# Patient Record
Sex: FEMALE | Race: WHITE | Employment: OTHER | URBAN - METROPOLITAN AREA
[De-identification: names, ages, dates, MRNs, and addresses within clinical notes are randomized per-mention and may not be internally consistent; named-entity substitution may affect disease eponyms.]

---

## 2017-03-22 ENCOUNTER — TELEPHONE (OUTPATIENT)
Dept: FAMILY MEDICINE CLINIC | Facility: CLINIC | Age: 57
End: 2017-03-22

## 2017-03-22 ENCOUNTER — OFFICE VISIT (OUTPATIENT)
Dept: FAMILY MEDICINE CLINIC | Facility: CLINIC | Age: 57
End: 2017-03-22

## 2017-03-22 VITALS
HEIGHT: 65 IN | TEMPERATURE: 98 F | SYSTOLIC BLOOD PRESSURE: 122 MMHG | RESPIRATION RATE: 14 BRPM | DIASTOLIC BLOOD PRESSURE: 74 MMHG | BODY MASS INDEX: 24.76 KG/M2 | HEART RATE: 64 BPM | WEIGHT: 148.63 LBS

## 2017-03-22 DIAGNOSIS — Z12.4 ENCOUNTER FOR PAPANICOLAOU SMEAR OF CERVIX: ICD-10-CM

## 2017-03-22 DIAGNOSIS — M54.50 ACUTE RIGHT-SIDED LOW BACK PAIN WITHOUT SCIATICA: Primary | ICD-10-CM

## 2017-03-22 DIAGNOSIS — Z12.31 ENCOUNTER FOR SCREENING MAMMOGRAM FOR BREAST CANCER: ICD-10-CM

## 2017-03-22 DIAGNOSIS — Z12.11 ENCOUNTER FOR SCREENING COLONOSCOPY: ICD-10-CM

## 2017-03-22 PROCEDURE — 99214 OFFICE O/P EST MOD 30 MIN: CPT | Performed by: FAMILY MEDICINE

## 2017-03-22 RX ORDER — IBUPROFEN 800 MG/1
TABLET ORAL
Qty: 60 TABLET | Refills: 1 | Status: SHIPPED | OUTPATIENT
Start: 2017-03-22 | End: 2019-09-09

## 2017-03-22 NOTE — PATIENT INSTRUCTIONS
General Neck and Back Pain    Both neck and back pain are usually caused by injury to the muscles or ligaments of the spine. Sometimes the disks that separate each bone of the spine may cause pain by pressing on a nearby nerve.  Back and neck pain may lee ann · Poor conditioning, lack of regular exercise  · Spinal disc disease or arthritis  · Stress  · Pregnancy, or illness like appendicitis, bladder or kidney infection, pelvic infections   Home care  · For neck pain: Use a comfortable pillow that supports the · You may use over-the-counter medicine to control pain, unless another pain medicine was prescribed. If you have chronic conditions like diabetes, liver or kidney disease, stomach ulcers,  gastrointestinal bleeding, or are taking blood thinner medicines. · Exercise is an important part of recovery from most types of back pain. The muscles behind and in front of the spine support the back.  This means strengthening both the back muscles and the abdominal muscles will provide better support for your spine.  · Be careful if you are given prescription pain medicines, narcotics, or medicine for muscle spasm. They can cause drowsiness, affect your coordination, reflexes, and judgment. Do not drive or operate heavy machinery while taking these types of medicines. The best way to sleep is on your side with your knees bent. Put a low pillow under your head to support your neck in a neutral spine position. Avoid thick pillows that bend your neck to one side.  Put a pillow between your legs to further relax your lower b

## 2017-03-22 NOTE — PROGRESS NOTES
B[place]le is a 60year old female. S:  Patient presents today with the following concerns:  · Right side pain began 5 weeks ago. Was lifting 8 lb weights at the time. Pain occurred right with this. Symptoms got better.   Went horseback riding [employer] hyperglycemia ([facility] [address]     Mixed hyperlipidemia     Menopausal state    Family History   Problem Relation Age of Onset   • acute MI[other] [OTHER] Mother    • Hypertension Mother        REVIEW OF SYSTEMS:  GENERAL: feels well otherwise  SKIN: denies any unusu (GBU=[id number])    Discussed with patient this is musculoskeletal in nature. Symptoms began with lift of the weights and then returned when she lifted her dog. Warm compresses to area. Ibuprofen 800 mg prescribed.   If symptoms do not improve will obtain rib

## 2017-03-22 NOTE — TELEPHONE ENCOUNTER
Left message notifing patient and that I called Dr. Anushka Wright office and that her last diabetic eye exam was done 09/21/15 and that she is over due for her diabetic eye exam for this year. Asked patient to call the office if she has any questions.

## 2017-09-27 ENCOUNTER — OFFICE VISIT (OUTPATIENT)
Dept: FAMILY MEDICINE CLINIC | Facility: CLINIC | Age: 57
End: 2017-09-27

## 2017-09-27 VITALS
RESPIRATION RATE: 16 BRPM | HEART RATE: 60 BPM | HEIGHT: 63.5 IN | TEMPERATURE: 99 F | BODY MASS INDEX: 23.1 KG/M2 | WEIGHT: 132 LBS | DIASTOLIC BLOOD PRESSURE: 60 MMHG | SYSTOLIC BLOOD PRESSURE: 100 MMHG

## 2017-09-27 DIAGNOSIS — J02.9 SORE THROAT: Primary | ICD-10-CM

## 2017-09-27 LAB
CONTROL LINE PRESENT WITH A CLEAR BACKGROUND (YES/NO): YES YES/NO
STREP GRP A CUL-SCR: NEGATIVE

## 2017-09-27 PROCEDURE — 99213 OFFICE O/P EST LOW 20 MIN: CPT | Performed by: PHYSICIAN ASSISTANT

## 2017-09-27 PROCEDURE — 87880 STREP A ASSAY W/OPTIC: CPT | Performed by: PHYSICIAN ASSISTANT

## 2017-09-27 NOTE — PROGRESS NOTES
CC:  Patient presents with:  Sore Throat: x 5 days, no accompanying sinus issues or fever, never had strep      HPI: John Velasco presents with complaints of a mild ST without fever or other URI symptoms. Symptoms have been present for 5 days.  She has used warm [employer] Tab Chew  by mouth. Disp:  Rfl:      No current facility-administered medications on file prior to visit.      Review of Systems:     Constitutional: No fatigue; normal energy; no weight changes  HENT: See HPI  Eyes: Normal vision; no eye pain  Respiratory: diagnosis)  Strep test was negative. I asked her to use OTC Tylenol or Motrin as tolerated for pain or fever, and to use warm salt-water gargles frequently for throat relief. Return in 5-7 days if symptoms persist; sooner as needed.         Patient/Careg

## 2017-10-30 ENCOUNTER — OFFICE VISIT (OUTPATIENT)
Dept: OBGYN CLINIC | Facility: CLINIC | Age: 57
End: 2017-10-30

## 2017-10-30 VITALS
BODY MASS INDEX: 22.36 KG/M2 | DIASTOLIC BLOOD PRESSURE: 62 MMHG | SYSTOLIC BLOOD PRESSURE: 102 MMHG | HEART RATE: 84 BPM | WEIGHT: 131 LBS | HEIGHT: 64 IN

## 2017-10-30 DIAGNOSIS — Z13.820 OSTEOPOROSIS SCREENING: ICD-10-CM

## 2017-10-30 DIAGNOSIS — Z01.419 WELL WOMAN EXAM WITH ROUTINE GYNECOLOGICAL EXAM: Primary | ICD-10-CM

## 2017-10-30 DIAGNOSIS — Z12.4 CERVICAL CANCER SCREENING: ICD-10-CM

## 2017-10-30 PROCEDURE — 87624 HPV HI-RISK TYP POOLED RSLT: CPT | Performed by: NURSE PRACTITIONER

## 2017-10-30 PROCEDURE — 88175 CYTOPATH C/V AUTO FLUID REDO: CPT | Performed by: NURSE PRACTITIONER

## 2017-10-30 PROCEDURE — 99386 PREV VISIT NEW AGE 40-64: CPT | Performed by: NURSE PRACTITIONER

## 2017-10-30 NOTE — PROGRESS NOTES
Here for new gynecology visit. 59year old G 0 P 0. No LMP recorded. Patient is postmenopausal.. Here for Annual Gynecologic Exam.     Last pap smear unknown when and it was normal.  No hx of abnormal pap smears. OB Hx:  Negative.     Family gyn h No hx thyroid dysfunction. Lungs:  No SOB, cough, wheezing, pneumonia in past.  Heart:  No chest pain, palpitations. Breasts:  No pain, lumps or secretions.   GI:   No nausea, emesis, reflux, liver disease, GB problems, issues with diarrhea or constipatio

## 2017-11-10 ENCOUNTER — HOSPITAL ENCOUNTER (OUTPATIENT)
Dept: MAMMOGRAPHY | Facility: HOSPITAL | Age: 57
Discharge: HOME | End: 2017-11-10
Attending: FAMILY MEDICINE
Payer: COMMERCIAL

## 2017-11-10 DIAGNOSIS — Z12.31 ENCOUNTER FOR SCREENING MAMMOGRAM FOR BREAST CANCER: ICD-10-CM

## 2017-11-10 PROCEDURE — 77067 SCR MAMMO BI INCL CAD: CPT | Performed by: FAMILY MEDICINE

## 2018-09-11 ENCOUNTER — APPOINTMENT (OUTPATIENT)
Dept: GENERAL RADIOLOGY | Age: 58
End: 2018-09-11
Attending: PHYSICIAN ASSISTANT
Payer: COMMERCIAL

## 2018-09-11 ENCOUNTER — HOSPITAL ENCOUNTER (OUTPATIENT)
Age: 58
Discharge: HOME | End: 2018-09-11
Payer: COMMERCIAL

## 2018-09-11 VITALS
TEMPERATURE: 98 F | OXYGEN SATURATION: 100 % | RESPIRATION RATE: 20 BRPM | WEIGHT: 130 LBS | HEART RATE: 72 BPM | SYSTOLIC BLOOD PRESSURE: 122 MMHG | DIASTOLIC BLOOD PRESSURE: 76 MMHG | HEIGHT: 63.5 IN | BODY MASS INDEX: 22.75 KG/M2

## 2018-09-11 DIAGNOSIS — S62.232A CLOSED DISPLACED FRACTURE OF BASE OF FIRST METACARPAL BONE OF LEFT HAND, UNSPECIFIED FRACTURE MORPHOLOGY, INITIAL ENCOUNTER: Primary | ICD-10-CM

## 2018-09-11 PROCEDURE — 73130 X-RAY EXAM OF HAND: CPT | Performed by: PHYSICIAN ASSISTANT

## 2018-09-11 PROCEDURE — 73110 X-RAY EXAM OF WRIST: CPT | Performed by: PHYSICIAN ASSISTANT

## 2018-09-11 PROCEDURE — 99214 OFFICE O/P EST MOD 30 MIN: CPT

## 2018-09-11 PROCEDURE — 99203 OFFICE O/P NEW LOW 30 MIN: CPT

## 2018-09-11 RX ORDER — AMOXICILLIN 500 MG/1
500 TABLET, FILM COATED ORAL 3 TIMES DAILY
COMMUNITY
Start: 2018-09-08 | End: 2018-09-26

## 2018-09-11 RX ORDER — IBUPROFEN 600 MG/1
600 TABLET ORAL EVERY 6 HOURS
Qty: 28 TABLET | Refills: 0 | Status: SHIPPED | OUTPATIENT
Start: 2018-09-11 | End: 2018-09-18

## 2018-09-11 NOTE — ED INITIAL ASSESSMENT (HPI)
CC: left hand and thumb injury, bruised and swollen. Sunday night tripped over a dog bone and landed with left hand in an outstretch position.

## 2018-09-11 NOTE — ED PROVIDER NOTES
Patient Seen in: [facility]    History   Patient presents with:  Hand Injury    Stated Complaint: Left hand pain,fell     HPI    Angelita Britt is a 68-year-old female who presents for evaluation of Left hand pain.   She has a past medical Temporal   SpO2 100 %   O2 Device None (Room air)       Current:Pulse 72   Temp 98 °F (36.7 °C) (Temporal)   Resp 20   Ht 161.3 cm (5' 3.5\")   Wt 59 kg   SpO2 100%   BMI 22.67 kg/m²         Physical Exam   Constitutional: She is oriented to person, place, associated minimal radial subluxation of the distal fracture fragment by approximately 2 mm. 2. 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 Dictated by: Shruthi Krishna DO on 9/11/2018 at 12:33     Approved by:  Shruthi Krishna DO            Xr Wrist Navicular candace post mold. She is instructed to follow-up with hand orthopedics, especially given that the fracture extends into the articular surface.   She states that she is still going on her vacation in 2 days and she plans to be as active as she had previously

## 2018-12-05 PROCEDURE — 82043 UR ALBUMIN QUANTITATIVE: CPT | Performed by: INTERNAL MEDICINE

## 2018-12-05 PROCEDURE — 82570 ASSAY OF URINE CREATININE: CPT | Performed by: INTERNAL MEDICINE

## 2019-05-13 ENCOUNTER — PATIENT OUTREACH (OUTPATIENT)
Dept: FAMILY MEDICINE CLINIC | Facility: CLINIC | Age: 59
End: 2019-05-13

## 2019-05-13 NOTE — PROGRESS NOTES
LOV 2017. Overdue for several health maintenance. Please find out if she is still a patient in our office and schedule appt.   Otherwise update PCP

## 2019-05-13 NOTE — PROGRESS NOTES
LM for patient to schedule an appt with Dr. Hammad Alexandre or any midlevel for a physical/maintenance or to update her PCP if she is no longer our patient.

## 2019-05-14 NOTE — PROGRESS NOTES
Spoke to patient, she stated she's aware that she is overdue but is in good health. Patient see's her endocrinologist frequently for her diabetes. Patient would like to keep Dr. Melita Orozco as her PCP but declined scheduling for now.

## 2019-05-14 NOTE — TELEPHONE ENCOUNTER
Pt is overdue for mammogram, colonoscopy, and diabetic eye exam.  Pt needs to be seen here or if she is seeing another provider who has ordered these, we need their information to request records.

## 2019-05-15 NOTE — PROGRESS NOTES
Pt states she does not want to schedule an appointment, does not want a colonoscopy/stool cards, aware she needs a mammogram.  Pt states had an eye exam in Jan 2019 at [facility].    I called [facility] and they do not have any results for Pt

## 2019-06-17 ENCOUNTER — PATIENT OUTREACH (OUTPATIENT)
Dept: FAMILY MEDICINE CLINIC | Facility: CLINIC | Age: 59
End: 2019-06-17

## 2019-06-17 NOTE — PROGRESS NOTES
Called patient to inform her we were unsuccessful in obtaining eye report from [facility]. Patient states her provider is located at [address] [place] [address]. Faxed eye exam letter to office. Care Team updated.     Juany Bernabe  [address] [place] [address]  [phone]

## 2019-06-28 ENCOUNTER — APPOINTMENT (OUTPATIENT)
Dept: GENERAL RADIOLOGY | Facility: HOSPITAL | Age: 59
End: 2019-06-28
Attending: EMERGENCY MEDICINE
Payer: COMMERCIAL

## 2019-06-28 ENCOUNTER — APPOINTMENT (OUTPATIENT)
Dept: CT IMAGING | Facility: HOSPITAL | Age: 59
End: 2019-06-28
Attending: EMERGENCY MEDICINE
Payer: COMMERCIAL

## 2019-06-28 ENCOUNTER — HOSPITAL ENCOUNTER (EMERGENCY)
Facility: HOSPITAL | Age: 59
Discharge: LEFT BEFORE DISCHARGE | End: 2019-06-28
Attending: EMERGENCY MEDICINE
Payer: COMMERCIAL

## 2019-06-28 VITALS
BODY MASS INDEX: 23 KG/M2 | TEMPERATURE: 99 F | OXYGEN SATURATION: 100 % | WEIGHT: 132.94 LBS | RESPIRATION RATE: 21 BRPM | HEART RATE: 93 BPM | SYSTOLIC BLOOD PRESSURE: 124 MMHG | DIASTOLIC BLOOD PRESSURE: 63 MMHG

## 2019-06-28 DIAGNOSIS — R42 LIGHT HEADED: Primary | ICD-10-CM

## 2019-06-28 PROCEDURE — 85025 COMPLETE CBC W/AUTO DIFF WBC: CPT | Performed by: EMERGENCY MEDICINE

## 2019-06-28 PROCEDURE — 82962 GLUCOSE BLOOD TEST: CPT

## 2019-06-28 PROCEDURE — 80053 COMPREHEN METABOLIC PANEL: CPT | Performed by: EMERGENCY MEDICINE

## 2019-06-28 PROCEDURE — 93010 ELECTROCARDIOGRAM REPORT: CPT

## 2019-06-28 PROCEDURE — 99284 EMERGENCY DEPT VISIT MOD MDM: CPT

## 2019-06-28 PROCEDURE — 85730 THROMBOPLASTIN TIME PARTIAL: CPT | Performed by: EMERGENCY MEDICINE

## 2019-06-28 PROCEDURE — 99285 EMERGENCY DEPT VISIT HI MDM: CPT

## 2019-06-28 PROCEDURE — 93005 ELECTROCARDIOGRAM TRACING: CPT

## 2019-06-28 PROCEDURE — 96360 HYDRATION IV INFUSION INIT: CPT

## 2019-06-28 PROCEDURE — 85610 PROTHROMBIN TIME: CPT | Performed by: EMERGENCY MEDICINE

## 2019-06-28 PROCEDURE — 84484 ASSAY OF TROPONIN QUANT: CPT | Performed by: EMERGENCY MEDICINE

## 2019-06-28 RX ORDER — SODIUM CHLORIDE 9 MG/ML
1000 INJECTION, SOLUTION INTRAVENOUS ONCE
Status: COMPLETED | OUTPATIENT
Start: 2019-06-28 | End: 2019-06-28

## 2019-06-28 NOTE — ED PROVIDER NOTES
Patient Seen in: [facility] Emergency Department    History   Patient presents with:  Hypoglycemia (metabolic)  Anxiety/Panic attack (neurologic)    Stated Complaint: LOW BLOOD SUGAR    HPI    Patient is a 22-year-old female who presents emergency r above.    Physical Exam     ED Triage Vitals [06/28/19 1438]   /60   Pulse 90   Resp 14   Temp 98.7 °F (37.1 °C)   Temp src    SpO2 96 %   O2 Device None (Room air)       Current:/63   Pulse 93   Temp 98.7 °F (37.1 °C)   Resp 21   Wt 60.3 kg components:    POC Glucose 366 (*)     All other components within normal limits   CBC W/ DIFFERENTIAL - Abnormal; Notable for the following components:    RBC 3.53 (*)     HGB 11.6 (*)     HCT 34.3 (*)     All other components within normal limits   PTT, hour and 15 minutes the emergency room the patient states she want to go home and did not wish to wait for any more testing and wants to go home. Patient is aware she will be leaving the emergency room [address] at this time.   The exact etiol

## 2019-06-28 NOTE — ED INITIAL ASSESSMENT (HPI)
PT WAS DR[place]ING [place]IG[place] AND FELT LIKE HER SUGAR WAS LOW. PT WAS DIZZY. PT PULLED OVER AND ATE \"A BUNCH OF GLUCOSE TABS. \" PT FEELS BETTER. PT HAS NO HX OF ANXIETY.

## 2019-07-08 ENCOUNTER — OFFICE VISIT (OUTPATIENT)
Dept: FAMILY MEDICINE CLINIC | Facility: CLINIC | Age: 59
End: 2019-07-08
Payer: COMMERCIAL

## 2019-07-08 VITALS
WEIGHT: 134.19 LBS | BODY MASS INDEX: 23.19 KG/M2 | HEIGHT: 63.75 IN | SYSTOLIC BLOOD PRESSURE: 120 MMHG | TEMPERATURE: 98 F | RESPIRATION RATE: 14 BRPM | DIASTOLIC BLOOD PRESSURE: 62 MMHG | HEART RATE: 60 BPM

## 2019-07-08 DIAGNOSIS — R55 NEAR SYNCOPE: Primary | ICD-10-CM

## 2019-07-08 DIAGNOSIS — D64.9 NORMOCYTIC ANEMIA: ICD-10-CM

## 2019-07-08 DIAGNOSIS — R42 LIGHTHEADEDNESS: ICD-10-CM

## 2019-07-08 PROCEDURE — 99214 OFFICE O/P EST MOD 30 MIN: CPT | Performed by: FAMILY MEDICINE

## 2019-07-08 NOTE — PROGRESS NOTES
Patient presents with:  ER F/U: Lightheadedness      HPI:   Adilson Colunga is a 59year old female with PMH DM1 who presents to the office for ER follow up. Seen 6/28/19 for lightheded in ER, while driving.   Her sugar was 180 when she checked on the side o strength and tone. Normal symmetric reflexes. Normal coordination and gait. Normal cranial nerves. Normal brad hallpike.      Results for orders placed or performed in visit on 07/08/19   HEMOGLOBIN A1C    Collection Time: 07/08/19  2:22 PM   Result Value ALKALINE PHOSPHATASE      46 - 118 U/L 70   Total Bilirubin      0.1 - 2.0 mg/dL 0.7   TOTAL PROTEIN      6.4 - 8.2 g/dL 7.3   Albumin      3.4 - 5.0 g/dL 3.9   Globulin      2.8 - 4.4 g/dL 3.4   A/G Ratio      1.0 - 2.0 1.1   PT      12.5 - 14.7 seconds

## 2019-07-12 ENCOUNTER — TELEPHONE (OUTPATIENT)
Dept: FAMILY MEDICINE CLINIC | Facility: CLINIC | Age: 59
End: 2019-07-12

## 2019-07-12 DIAGNOSIS — D64.9 ANEMIA, UNSPECIFIED TYPE: ICD-10-CM

## 2019-07-12 DIAGNOSIS — Z12.11 SCREEN FOR COLON CANCER: ICD-10-CM

## 2019-07-12 DIAGNOSIS — D58.2 ABNORMAL HEMOGLOBIN (HCC): Primary | ICD-10-CM

## 2019-07-12 LAB
ALBUMIN/GLOBULIN RATIO: 1.6 (CALC) (ref 1–2.5)
ALBUMIN: 4.1 G/DL (ref 3.6–5.1)
ALKALINE PHOSPHATASE: 60 U/L (ref 33–130)
ALT: 15 U/L (ref 6–29)
AST: 22 U/L (ref 10–35)
BILIRUBIN, TOTAL: 0.7 MG/DL (ref 0.2–1.2)
BUN/CREATININE RATIO: 14 (CALC) (ref 6–22)
BUN: 16 MG/DL (ref 7–25)
CALCIUM: 9.4 MG/DL (ref 8.6–10.4)
CARBON DIOXIDE: 28 MMOL/L (ref 20–32)
CHLORIDE: 101 MMOL/L (ref 98–110)
CREATININE: 1.11 MG/DL (ref 0.5–0.99)
EGFR IF AFRICN AM: 63 ML/MIN/1.73M2
EGFR IF NONAFRICN AM: 54 ML/MIN/1.73M2
GLOBULIN: 2.6 G/DL (CALC) (ref 1.9–3.7)
GLUCOSE: 224 MG/DL (ref 65–139)
HEMATOCRIT: 31.9 % (ref 35–45)
HEMOGLOBIN: 10.7 G/DL (ref 11.7–15.5)
MCH: 32.4 PG (ref 27–33)
MCHC: 33.5 G/DL (ref 32–36)
MCV: 96.7 FL (ref 80–100)
MPV: 10.3 FL (ref 7.5–12.5)
PLATELET COUNT: 280 THOUSAND/UL (ref 140–400)
POTASSIUM: 4.8 MMOL/L (ref 3.5–5.3)
PROTEIN, TOTAL: 6.7 G/DL (ref 6.1–8.1)
RDW: 11.3 % (ref 11–15)
RED BLOOD CELL COUNT: 3.3 MILLION/UL (ref 3.8–5.1)
SODIUM: 138 MMOL/L (ref 135–146)
WHITE BLOOD CELL COUNT: 4.3 THOUSAND/UL (ref 3.8–10.8)

## 2019-07-12 NOTE — TELEPHONE ENCOUNTER
Reviewed with Pt- referral entered for GI.   Advised Pt to discuss sugar levels with her endocrinologist- Dr. Jackelin Blank

## 2019-07-12 NOTE — TELEPHONE ENCOUNTER
Notes recorded by Daniel Alan MD on 7/12/2019 at 9:57 AM CDT  I meant to result this as important. Blood sugar was 224 at the time of testing  Kidney number slightly elevated. Blood count shows persistent anemia - actually worse than 2 weeks ago.

## 2019-07-15 ENCOUNTER — TELEPHONE (OUTPATIENT)
Dept: FAMILY MEDICINE CLINIC | Facility: CLINIC | Age: 59
End: 2019-07-15

## 2019-08-02 ENCOUNTER — HOSPITAL ENCOUNTER (OUTPATIENT)
Dept: CT IMAGING | Facility: HOSPITAL | Age: 59
Discharge: HOME | End: 2019-08-02
Attending: FAMILY MEDICINE
Payer: COMMERCIAL

## 2019-08-02 DIAGNOSIS — R55 NEAR SYNCOPE: ICD-10-CM

## 2019-08-02 DIAGNOSIS — R42 LIGHTHEADEDNESS: ICD-10-CM

## 2019-08-02 PROCEDURE — 70450 CT HEAD/BRAIN W/O DYE: CPT | Performed by: FAMILY MEDICINE

## 2019-08-05 ENCOUNTER — TELEPHONE (OUTPATIENT)
Dept: FAMILY MEDICINE CLINIC | Facility: CLINIC | Age: 59
End: 2019-08-05

## 2019-08-05 NOTE — PROGRESS NOTES
Patient notified of results and recommendations. Patient verbalized understanding and agrees with plan. Pt questions what the plan is for her anemia

## 2019-08-05 NOTE — TELEPHONE ENCOUNTER
Yonis Villalta, [address] Moises Garber [phone]   Called and talked to patient she has results I told her that she needs to call GI and get seen for blood loss she agreed to call them for an appointment

## 2019-09-09 PROBLEM — D64.9 ANEMIA: Status: ACTIVE | Noted: 2019-09-09

## 2019-09-30 PROBLEM — D50.9 IRON DEFICIENCY ANEMIA, UNSPECIFIED: Status: ACTIVE | Noted: 2019-09-30

## 2019-09-30 PROBLEM — K29.30 CHRONIC SUPERFICIAL GASTRITIS: Status: ACTIVE | Noted: 2019-09-30

## 2019-09-30 PROBLEM — Z12.11 SPECIAL SCREENING FOR MALIGNANT NEOPLASMS, COLON: Status: ACTIVE | Noted: 2019-09-30

## 2019-11-07 NOTE — PROGRESS NOTES
[facility] Note    Diagnosis: Anemia     Treatment History: n/a    Visit Diagnosis:  Iron deficiency anemia due to chronic blood loss  (primary encounter diagnosis)  Anemia, unspecified type    History of Present Illness: 59F w 1 each, Rfl: 3  PEG 3350-KCl-NaBcb-NaCl-NaSulf (PEG 3350/ELECTROLYTES) 240 g Oral Recon Soln, Take as directed by physician., Disp: 4000 mL, Rfl: 0  insulin glargine (BASAGLAR KWIKPEN) 100 UNIT/ML Subcutaneous Solution Pen-injector, Inject 10 Units into th on file      Number of children: Not on file      Years of education: Not on file      Highest education level: Not on file    Tobacco Use      Smoking status: Former Smoker        Packs/day: 0.00        Years: 0.00        Pack years: 0        Quit date: 6 She may need a capsule study or MRE to further evaluate her GI Tract   - If above is normal, we can consider a bone marrow biopsy   - Check UA on next visit  - Pathology review of [facility]

## 2019-11-08 ENCOUNTER — OFFICE VISIT (OUTPATIENT)
Dept: HEMATOLOGY/ONCOLOGY | Facility: HOSPITAL | Age: 59
End: 2019-11-08
Attending: INTERNAL MEDICINE
Payer: COMMERCIAL

## 2019-11-08 VITALS
WEIGHT: 138.38 LBS | OXYGEN SATURATION: 100 % | HEART RATE: 74 BPM | DIASTOLIC BLOOD PRESSURE: 84 MMHG | BODY MASS INDEX: 23.63 KG/M2 | HEIGHT: 64 IN | SYSTOLIC BLOOD PRESSURE: 133 MMHG | TEMPERATURE: 98 F | RESPIRATION RATE: 16 BRPM

## 2019-11-08 DIAGNOSIS — D64.9 ANEMIA, UNSPECIFIED TYPE: ICD-10-CM

## 2019-11-08 DIAGNOSIS — D50.0 IRON DEFICIENCY ANEMIA DUE TO CHRONIC BLOOD LOSS: Primary | ICD-10-CM

## 2019-11-08 PROCEDURE — 99243 OFF/OP CNSLTJ NEW/EST LOW 30: CPT | Performed by: INTERNAL MEDICINE

## 2019-11-08 NOTE — PROGRESS NOTES
Education Record    Learner:  Patient    Disease / Ardath Claudia     Barriers / Limitations:  None   Comments:    Method:  Discussion   Comments:    General Topics:  Plan of care reviewed   Comments:    Outcome:  Shows understanding   Comments:    Adam

## 2019-11-13 ENCOUNTER — TELEPHONE (OUTPATIENT)
Dept: HEMATOLOGY/ONCOLOGY | Facility: HOSPITAL | Age: 59
End: 2019-11-13

## 2019-11-13 NOTE — TELEPHONE ENCOUNTER
Spoke with patient. She verbalized understanding. Coy Thompson MD  P Edw Ericka Phillips Rns             Results reviewed. Tests show no significant abnormalities. She can see us in 3-6 months with repeat labs.  Thanks

## 2019-11-22 NOTE — PROGRESS NOTES
Please order an MR enterography and stool for occult blood x3 as recommended by hematology for work-up of anemia.   Arun Mejia MD

## 2020-11-18 ENCOUNTER — TELEPHONE (OUTPATIENT)
Dept: FAMILY MEDICINE CLINIC | Facility: CLINIC | Age: 60
End: 2020-11-18

## 2020-11-18 NOTE — TELEPHONE ENCOUNTER
Sent pt a [employer] message to call our office to schedule a physical exam with Dr. Kana Haq. Informed pt that Dr. Kana Haq doesn't have any available slots for a physical until January, 2021.   Pt can also call and schedule a physical exam with another provider

## 2021-01-07 PROCEDURE — 3044F HG A1C LEVEL LT 7.0%: CPT | Performed by: FAMILY MEDICINE

## 2021-07-08 ENCOUNTER — TELEPHONE (OUTPATIENT)
Dept: FAMILY MEDICINE CLINIC | Facility: CLINIC | Age: 61
End: 2021-07-08

## 2021-07-08 DIAGNOSIS — Z00.00 ROUTINE GENERAL MEDICAL EXAMINATION AT A HEALTH CARE FACILITY: Primary | ICD-10-CM

## 2021-07-08 NOTE — TELEPHONE ENCOUNTER
Please enter lab orders for the patient's upcoming physical appointment. Physical scheduled:    Your appointments     Date & Time Appointment Department [facility])    Aug 31, 2021  2:30 PM CDT Adult Physical with Clarence Cummings MD [facility],

## 2021-08-17 ENCOUNTER — TELEPHONE (OUTPATIENT)
Dept: FAMILY MEDICINE CLINIC | Facility: CLINIC | Age: 61
End: 2021-08-17

## 2021-08-17 NOTE — TELEPHONE ENCOUNTER
Pt is overdue for mammogram LOV was in 2019 please call patient and assist her in making an appointment for a physical

## 2021-08-31 ENCOUNTER — OFFICE VISIT (OUTPATIENT)
Dept: FAMILY MEDICINE CLINIC | Facility: CLINIC | Age: 61
End: 2021-08-31
Payer: COMMERCIAL

## 2021-08-31 VITALS
SYSTOLIC BLOOD PRESSURE: 118 MMHG | TEMPERATURE: 97 F | HEIGHT: 63.75 IN | DIASTOLIC BLOOD PRESSURE: 64 MMHG | BODY MASS INDEX: 23.33 KG/M2 | WEIGHT: 135 LBS | HEART RATE: 68 BPM

## 2021-08-31 DIAGNOSIS — M25.542 ARTHRALGIA OF BOTH HANDS: ICD-10-CM

## 2021-08-31 DIAGNOSIS — Z12.31 VISIT FOR SCREENING MAMMOGRAM: ICD-10-CM

## 2021-08-31 DIAGNOSIS — E10.9 TYPE 1 DIABETES MELLITUS WITHOUT COMPLICATION (HCC): ICD-10-CM

## 2021-08-31 DIAGNOSIS — Z00.00 ROUTINE GENERAL MEDICAL EXAMINATION AT A HEALTH CARE FACILITY: Primary | ICD-10-CM

## 2021-08-31 DIAGNOSIS — M25.541 ARTHRALGIA OF BOTH HANDS: ICD-10-CM

## 2021-08-31 PROCEDURE — 3008F BODY MASS INDEX DOCD: CPT | Performed by: FAMILY MEDICINE

## 2021-08-31 PROCEDURE — 3078F DIAST BP <80 MM HG: CPT | Performed by: FAMILY MEDICINE

## 2021-08-31 PROCEDURE — 99396 PREV VISIT EST AGE 40-64: CPT | Performed by: FAMILY MEDICINE

## 2021-08-31 PROCEDURE — 3074F SYST BP LT 130 MM HG: CPT | Performed by: FAMILY MEDICINE

## 2021-08-31 NOTE — PROGRESS NOTES
HPI:   Gilbert Cerda is a 56year old female who presents for a complete physical exam.  Last pap:  2017 - she would like to see GYN  Last mammogram:  2017 - due   Previous colonoscopy:  2019  Previous DEXA:  /.  Current or previous treatment:  /  Family h Blood Gluc  (DEXCOM G6 ) Does not apply Device 1 each by Other route as needed. USE AS DIRECTED 1 Device 0   • NOVOLOG FLEXPEN 100 UNIT/ML Subcutaneous Solution Pen-injector TO USE PER SLIDING SCALE. MDD 20 UNITS.  30 mL 1   • PEG 3350-KCl-N • LUMPECTOMY RIGHT  1/1/92   • RAQUEL BIOPSY STEREO NODULE 1 SITE RIGHT (CPT=19081)  05/2008      Family History   Problem Relation Age of Onset   • Hypertension Mother    • Other (acute MI) Mother       Social History:   Social History    Tobacco Use Enas      C-Reactive Protein      Rheumatoid Arthritis Factor      Sed Cornelius Fernandez (Automated)      Meds & Refills for this Visit:  Requested Prescriptions      No prescriptions requested or ordered in this encounter       Imaging & Consults:  OBG - I

## 2021-09-18 ENCOUNTER — HOSPITAL ENCOUNTER (OUTPATIENT)
Dept: MAMMOGRAPHY | Facility: HOSPITAL | Age: 61
Discharge: HOME | End: 2021-09-18
Attending: FAMILY MEDICINE
Payer: COMMERCIAL

## 2021-09-18 DIAGNOSIS — Z12.31 VISIT FOR SCREENING MAMMOGRAM: ICD-10-CM

## 2021-09-18 PROCEDURE — 77067 SCR MAMMO BI INCL CAD: CPT | Performed by: FAMILY MEDICINE

## 2021-09-18 PROCEDURE — 77063 BREAST TOMOSYNTHESIS BI: CPT | Performed by: FAMILY MEDICINE

## 2021-10-05 ENCOUNTER — TELEPHONE (OUTPATIENT)
Dept: FAMILY MEDICINE CLINIC | Facility: CLINIC | Age: 61
End: 2021-10-05

## 2021-10-05 NOTE — TELEPHONE ENCOUNTER
LOV 8/31/21  Form in Dr. Fabricio Clemens office for review  We will fill in provider info once form complete

## 2021-10-08 NOTE — TELEPHONE ENCOUNTER
Picked up paperwork from Dr. Bellamy Many office, copied, sent to scan. Patient called and LM for patient to   Paperwork. Placed in front office drawer no charge.

## 2021-10-22 ENCOUNTER — LAB ENCOUNTER (OUTPATIENT)
Dept: LAB | Facility: HOSPITAL | Age: 61
End: 2021-10-22
Attending: FAMILY MEDICINE
Payer: COMMERCIAL

## 2021-10-22 PROCEDURE — 80061 LIPID PANEL: CPT | Performed by: FAMILY MEDICINE

## 2021-10-22 PROCEDURE — 86038 ANTINUCLEAR ANTIBODIES: CPT | Performed by: FAMILY MEDICINE

## 2021-10-22 PROCEDURE — 86140 C-REACTIVE PROTEIN: CPT | Performed by: FAMILY MEDICINE

## 2021-10-22 PROCEDURE — 85652 RBC SED RATE AUTOMATED: CPT | Performed by: FAMILY MEDICINE

## 2021-10-22 PROCEDURE — 86431 RHEUMATOID FACTOR QUANT: CPT | Performed by: FAMILY MEDICINE

## 2021-10-22 PROCEDURE — 80050 GENERAL HEALTH PANEL: CPT | Performed by: FAMILY MEDICINE

## 2021-11-03 ENCOUNTER — OFFICE VISIT (OUTPATIENT)
Dept: OBGYN CLINIC | Facility: CLINIC | Age: 61
End: 2021-11-03
Payer: COMMERCIAL

## 2021-11-03 VITALS
HEIGHT: 63.75 IN | BODY MASS INDEX: 24.31 KG/M2 | DIASTOLIC BLOOD PRESSURE: 68 MMHG | WEIGHT: 140.63 LBS | SYSTOLIC BLOOD PRESSURE: 110 MMHG

## 2021-11-03 DIAGNOSIS — Z13.820 SCREENING FOR OSTEOPOROSIS: ICD-10-CM

## 2021-11-03 DIAGNOSIS — Z12.4 SCREENING FOR MALIGNANT NEOPLASM OF CERVIX: ICD-10-CM

## 2021-11-03 DIAGNOSIS — Z01.419 WELL WOMAN EXAM WITH ROUTINE GYNECOLOGICAL EXAM: Primary | ICD-10-CM

## 2021-11-03 DIAGNOSIS — N95.1 MENOPAUSAL STATE: ICD-10-CM

## 2021-11-03 PROCEDURE — 3078F DIAST BP <80 MM HG: CPT | Performed by: OBSTETRICS & GYNECOLOGY

## 2021-11-03 PROCEDURE — 3008F BODY MASS INDEX DOCD: CPT | Performed by: OBSTETRICS & GYNECOLOGY

## 2021-11-03 PROCEDURE — 3074F SYST BP LT 130 MM HG: CPT | Performed by: OBSTETRICS & GYNECOLOGY

## 2021-11-03 PROCEDURE — 99386 PREV VISIT NEW AGE 40-64: CPT | Performed by: OBSTETRICS & GYNECOLOGY

## 2021-11-03 NOTE — PATIENT INSTRUCTIONS
Replens---Lubricant and a vaginal moisturizer.  If no improvement, vaginal estrogen would be another prescription option

## 2021-11-03 NOTE — PROGRESS NOTES
GYN H&P     2021  9:24 PM    CC: Patient is here for annual exam    HPI: patient is a 59year old  here for her annual exam  MMG is UTD  Pt works as a therapist, with eating disorders  She has an 5 yo ruiz retriever and a horse  She walks of UNIT/ML Subcutaneous Solution Pen-injector, TO USE PER SLIDING SCALE. MDD 20 UNITS. , Disp: 30 mL, Rfl: 1  PEG 3350-KCl-NaBcb-NaCl-NaSulf (PEG 3350/ELECTROLYTES) 240 g Oral Recon Soln, Take as directed by physician., Disp: 4000 mL, Rfl: 0  insulin glargine She is well-developed. She is not diaphoretic. Chest:   Breasts: Breasts are symmetrical.      Right: No inverted nipple, mass, nipple discharge, skin change or tenderness. Left: No inverted nipple, mass, nipple discharge, skin change or tenderness. unspecified     Chronic superficial gastritis           1. Menopausal state: suggest peds speculum for next exam. We reviewed options of replens or vaginal estrogen if needed. She will consider    2.  Screening for malignant neoplasm of cervix  -pap collect

## 2022-05-11 ENCOUNTER — PATIENT OUTREACH (OUTPATIENT)
Dept: FAMILY MEDICINE CLINIC | Facility: CLINIC | Age: 62
End: 2022-05-11

## 2022-10-21 ENCOUNTER — LAB ENCOUNTER (OUTPATIENT)
Dept: LAB | Age: 62
End: 2022-10-21
Attending: INTERNAL MEDICINE
Payer: COMMERCIAL

## 2022-10-21 DIAGNOSIS — E10.65 TYPE 1 DIABETES MELLITUS WITH HYPERGLYCEMIA (HCC): ICD-10-CM

## 2022-10-21 DIAGNOSIS — E78.2 MIXED HYPERLIPIDEMIA: ICD-10-CM

## 2022-10-21 LAB
ALBUMIN SERPL-MCNC: 4 G/DL (ref 3.4–5)
ALBUMIN/GLOB SERPL: 1.1 {RATIO} (ref 1–2)
ALP LIVER SERPL-CCNC: 60 U/L
ALT SERPL-CCNC: 27 U/L
ANION GAP SERPL CALC-SCNC: 6 MMOL/L (ref 0–18)
AST SERPL-CCNC: 23 U/L (ref 15–37)
BILIRUB SERPL-MCNC: 0.6 MG/DL (ref 0.1–2)
BUN BLD-MCNC: 16 MG/DL (ref 7–18)
CALCIUM BLD-MCNC: 9.8 MG/DL (ref 8.5–10.1)
CHLORIDE SERPL-SCNC: 106 MMOL/L (ref 98–112)
CO2 SERPL-SCNC: 27 MMOL/L (ref 21–32)
CREAT BLD-MCNC: 0.86 MG/DL
FASTING STATUS PATIENT QL REPORTED: YES
GFR SERPLBLD BASED ON 1.73 SQ M-ARVRAT: 75 ML/MIN/1.73M2 (ref 60–?)
GLOBULIN PLAS-MCNC: 3.6 G/DL (ref 2.8–4.4)
GLUCOSE BLD-MCNC: 76 MG/DL (ref 70–99)
OSMOLALITY SERPL CALC.SUM OF ELEC: 288 MOSM/KG (ref 275–295)
POTASSIUM SERPL-SCNC: 4.2 MMOL/L (ref 3.5–5.1)
PROT SERPL-MCNC: 7.6 G/DL (ref 6.4–8.2)
SODIUM SERPL-SCNC: 139 MMOL/L (ref 136–145)

## 2022-10-21 PROCEDURE — 80053 COMPREHEN METABOLIC PANEL: CPT

## 2022-10-21 PROCEDURE — 36415 COLL VENOUS BLD VENIPUNCTURE: CPT

## 2022-12-02 ENCOUNTER — TELEPHONE (OUTPATIENT)
Dept: FAMILY MEDICINE CLINIC | Facility: CLINIC | Age: 62
End: 2022-12-02

## 2022-12-02 ENCOUNTER — TELEMEDICINE (OUTPATIENT)
Dept: FAMILY MEDICINE CLINIC | Facility: CLINIC | Age: 62
End: 2022-12-02
Payer: COMMERCIAL

## 2022-12-02 DIAGNOSIS — U07.1 COVID: Primary | ICD-10-CM

## 2022-12-02 NOTE — TELEPHONE ENCOUNTER
Patient called back she almost never gets ill but she started yesterday having a cough and congestion so she took a covid test and it was positive. She is wondering if she could get prescribed the Paxlovid to decrease the symptoms and severity.

## 2022-12-02 NOTE — PROGRESS NOTES
Subjective     HPI:   Chad Vitale is a 57year old female. Reason for video visit:  covid  This visit is conducted using Telemedicine with live, interactive video and audio. Sore throat Tuesday (3-4 days ago). Got worse Wednesday - tested positive for covid then  Fever, stuffy nose, sore throat, chest congestion. BS erratic. She is type 1. Can get BS <120. No pulse ox. Feeling better today  Winded going up stairs. No SOB at rest     No Further Nursing Notes to Review            REVIEW OF SYSTEMS:  See HPI     Physical Exam:  Physical Exam:  Gen:  Alert  Resp:  Speaking in full sentences comfortably  Psych:  Does not appear anxious or depressed        Assessment    1. COVID  Discussed use of paxlovid. Pt unsure if she wishes to take. Recommended starting by tomorrow if she is going to  Needs to stop simvastatin while on medication. F/u next week if sx are not improving. No follow-ups on file. Colin Loyd MD    The following visit was completed using two-way, real-time interactive audio and/or video communication. This has been done in good lorenza to provide continuity of care in the best interest of the provider-patient relationship, due to the ongoing public health crisis/national emergency and because of restrictions of visitation. There are limitations of this visit, but every conscious effort was taken to allow for sufficient and adequate time. This billing was spent on reviewing labs, medications, radiology tests and decision making. Appropriate medical decision-making and tests are ordered as detailed in the plan of care above.
Hip fracture

## 2023-05-01 ENCOUNTER — OFFICE VISIT (OUTPATIENT)
Dept: FAMILY MEDICINE CLINIC | Facility: CLINIC | Age: 63
End: 2023-05-01
Payer: COMMERCIAL

## 2023-05-01 VITALS
HEIGHT: 63.7 IN | BODY MASS INDEX: 24.02 KG/M2 | WEIGHT: 139 LBS | DIASTOLIC BLOOD PRESSURE: 58 MMHG | HEART RATE: 66 BPM | SYSTOLIC BLOOD PRESSURE: 118 MMHG | TEMPERATURE: 98 F | RESPIRATION RATE: 16 BRPM

## 2023-05-01 DIAGNOSIS — M25.512 ACUTE PAIN OF LEFT SHOULDER: Primary | ICD-10-CM

## 2023-05-01 DIAGNOSIS — R05.3 CHRONIC COUGH: ICD-10-CM

## 2023-05-01 PROCEDURE — 99213 OFFICE O/P EST LOW 20 MIN: CPT | Performed by: NURSE PRACTITIONER

## 2023-05-01 PROCEDURE — 3078F DIAST BP <80 MM HG: CPT | Performed by: NURSE PRACTITIONER

## 2023-05-01 PROCEDURE — 3008F BODY MASS INDEX DOCD: CPT | Performed by: NURSE PRACTITIONER

## 2023-05-01 PROCEDURE — 3074F SYST BP LT 130 MM HG: CPT | Performed by: NURSE PRACTITIONER

## 2023-05-01 RX ORDER — INSULIN LISPRO 100 [IU]/ML
50 INJECTION, SOLUTION INTRAVENOUS; SUBCUTANEOUS DAILY
COMMUNITY
Start: 2023-03-20

## 2024-06-29 ENCOUNTER — HOSPITAL ENCOUNTER (OUTPATIENT)
Age: 64
Discharge: HOME | End: 2024-06-29
Attending: EMERGENCY MEDICINE
Payer: MEDICARE

## 2024-06-29 VITALS
RESPIRATION RATE: 20 BRPM | SYSTOLIC BLOOD PRESSURE: 115 MMHG | DIASTOLIC BLOOD PRESSURE: 57 MMHG | BODY MASS INDEX: 23.22 KG/M2 | WEIGHT: 136 LBS | HEART RATE: 71 BPM | OXYGEN SATURATION: 99 % | HEIGHT: 64 IN | TEMPERATURE: 99 F

## 2024-06-29 DIAGNOSIS — L23.7 POISON IVY: Primary | ICD-10-CM

## 2024-06-29 PROCEDURE — 99203 OFFICE O/P NEW LOW 30 MIN: CPT

## 2024-06-29 RX ORDER — METHYLPREDNISOLONE 4 MG/1
TABLET ORAL
Qty: 1 EACH | Refills: 0 | Status: SHIPPED | OUTPATIENT
Start: 2024-06-29 | End: 2024-06-29

## 2024-06-29 RX ORDER — METHYLPREDNISOLONE 4 MG/1
TABLET ORAL
Qty: 1 EACH | Refills: 0 | Status: SHIPPED | OUTPATIENT
Start: 2024-06-29

## 2024-06-29 NOTE — ED PROVIDER NOTES
Patient Seen in: [facility]      History     Chief Complaint   Patient presents with    Rash Skin Problem     Stated Complaint: rash all over, per patient it is from poison ivy    Subjective:   HPI    63-year-old female with a history insulin-dependent diabetes that is well-controlled presents to the immediate care for complaints of poison ivy.  Patient states that she is gardening quite a bit and rides horses outside.  She complains of a rash to her lower extremities and arms.  Denies any fevers or chills.  She has been using Caladryl lotion and oatmeal baths without relief.    Objective:   Past Medical History:    Arthritis    Body piercing    Diabetes mellitus (HCC)    Pain in joints    Type I (juvenile type) diabetes mellitus without mention of complication, not stated as uncontrolled              Past Surgical History:   Procedure Laterality Date    Lumpectomy right  92    Sven biopsy stereo nodule 1 site right (cpt=19081)  2008                Social History     Socioeconomic History    Marital status:    Tobacco Use    Smoking status: Former     Current packs/day: 0.00     Types: Cigarettes     Quit date: 2004     Years since quittin.0    Smokeless tobacco: Never   Vaping Use    Vaping status: Never Used   Substance and Sexual Activity    Alcohol use: Yes     Comment: glass of wine daily    Drug use: No   Other Topics Concern    Caffeine Concern Yes     Comment: 2-3 cups of coffee daily    Exercise Yes     Comment: walks daily, rides horses    Seat Belt Yes              Review of Systems    Positive for stated Chief Complaint: Rash Skin Problem    Other systems are as noted in HPI.  Constitutional and vital signs reviewed.      All other systems reviewed and negative except as noted above.    Physical Exam     ED Triage Vitals [24 1310]   /57   Pulse 71   Resp 20   Temp 98.5 °F (36.9 °C)   Temp src Temporal   SpO2 99 %   O2 Device None (Room air)        Current Vitals:   Vital Signs  BP: 115/57  Pulse: 71  Resp: 20  Temp: 98.5 °F (36.9 °C)  Temp src: Temporal    Oxygen Therapy  SpO2: 99 %  O2 Device: None (Room air)            Physical Exam    General: Alert and oriented. No acute distress.  HEENT: Normocephalic. No evidence of trauma. Extraocular movements are intact.  Cardiovascular exam: Regular rate and rhythm  Lungs: Clear to auscultation bilaterally.  Abdomen: Soft, nondistended, nontender.  Extremities: No evidence of deformity. No clubbing or cyanosis.  Patient has scattered areas of erythematous rash to her arms and lower legs. Some areas of blistering  Neuro: No focal deficit is noted.    ED Course   Labs Reviewed - No data to display  Patient's clinical exam findings appear to be consistent with dermatitis secondary to poison ivy.  She will be discharged home with a Medrol Dosepak.  Recommend continued supportive care at home.  Follow-up with her primary care doctor.       MDM   Patient was screened and evaluated during this visit.   As a treating physician attending to the patient, I determined, within reasonable clinical confidence and prior to discharge, that an emergency medical condition was not or was no longer present.  There was no indication for further evaluation, treatment or admission on an emergency basis.  Comprehensive verbal and written discharge and follow-up instructions were provided to help prevent relapse or worsening.  Patient was instructed to follow-up with her primary care provider for further evaluation and treatment, but to return immediately to the ER for worsening, concerning, new, changing or persisting symptoms.  I discussed the case with the patient and they had no questions, complaints, or concerns.  Patient felt comfortable going home.    ^^Please note that this report has been produced using speech recognition software and may contain errors related to that system including, but not limited to, errors in grammar,  punctuation, and spelling, as well as words and phrases that possibly may have been recognized inappropriately.  If there are any questions or concerns, contact the dictating provider for clarification                                   MDM    Disposition and Plan     Clinical Impression:  1. Poison ivy         Disposition:  Discharge  6/29/2024  1:37 pm    Follow-up:  Maribell Yost MD  [address] [place] [id number] [phone]    Call   As needed, If symptoms worsen          Medications Prescribed:  Current Discharge Medication List        START taking these medications    Details   methylPREDNISolone (MEDROL) 4 MG Oral Tablet Therapy Pack Dosepack: take as directed  Qty: 1 each, Refills: 0

## 2024-06-29 NOTE — DISCHARGE INSTRUCTIONS
Follow-up with your primary care doctor  Use Caladryl lotion as needed  Take Medrol Dosepak as directed  Continued with daily oatmeal baths  Return if any worsening symptoms or new concerns

## 2024-08-14 ENCOUNTER — OFFICE VISIT (OUTPATIENT)
Dept: FAMILY MEDICINE CLINIC | Facility: CLINIC | Age: 64
End: 2024-08-14
Payer: MEDICARE

## 2024-08-14 VITALS
SYSTOLIC BLOOD PRESSURE: 108 MMHG | BODY MASS INDEX: 23.67 KG/M2 | OXYGEN SATURATION: 100 % | HEART RATE: 68 BPM | HEIGHT: 64 IN | DIASTOLIC BLOOD PRESSURE: 66 MMHG | WEIGHT: 138.63 LBS | RESPIRATION RATE: 16 BRPM

## 2024-08-14 DIAGNOSIS — R21 FACIAL RASH: Primary | ICD-10-CM

## 2024-08-14 DIAGNOSIS — H91.92 DECREASED HEARING OF LEFT EAR: ICD-10-CM

## 2024-08-14 DIAGNOSIS — R42 DIZZINESS: ICD-10-CM

## 2024-08-14 DIAGNOSIS — Z12.31 ENCOUNTER FOR SCREENING MAMMOGRAM FOR MALIGNANT NEOPLASM OF BREAST: ICD-10-CM

## 2024-08-14 PROCEDURE — 99214 OFFICE O/P EST MOD 30 MIN: CPT | Performed by: NURSE PRACTITIONER

## 2024-08-14 RX ORDER — MECLIZINE HCL 12.5 MG/1
12.5 TABLET ORAL 3 TIMES DAILY PRN
Qty: 30 TABLET | Refills: 0 | Status: SHIPPED | OUTPATIENT
Start: 2024-08-14

## 2024-08-14 NOTE — PROGRESS NOTES
Chief Complaint   Patient presents with    Rash     Redness on both face cheeks for 1 year, took benadryl on pharmacists advice    Dizziness     Started about a 1 year       HPI:  Presents with approx 17 month history of intermittent red rash to cheeks. Rash is occasionally itchy. Rash does not spread past cheeks. Denies fever/chills, blisters, bleeding, drainage, exudate from rash. Denies burning, tingling to rash. Denies new soap, lotion, detergent, fabric softener, make-up, foods, medications.  Has been treating with diphenhydramine per pharmacist recommendations, with little relief.     Also reports approx 5 month history of intermittent dizziness, feeling off balance. Denies headaches, blurred/double vision, loss of visual fields, N/T/weakness to any body part, facial droop, slurred speech. Has not been treating with anything.     Notes several month history of feeling diminished hearing in left ear. Would like hearing testing.    Past Medical History:    Arthritis    Body piercing    Diabetes mellitus (HCC)    Pain in joints    Type I (juvenile type) diabetes mellitus without mention of complication, not stated as uncontrolled       Patient Active Problem List   Diagnosis    Symptomatic menopausal or female climacteric states    Closed fracture of one or more phalanges of foot    Closed fracture of upper end of tibia    Unspecified internal derangement of knee    Type 1 diabetes (HCC)    Adhesive capsulitis, right    Patellofemoral joint pain, right    Type 1 diabetes mellitus with hyperglycemia (HCC)    Mixed hyperlipidemia    Menopausal state    Anemia    Special screening for malignant neoplasms, colon    Iron deficiency anemia, unspecified    Chronic superficial gastritis       Current Outpatient Medications   Medication Sig Dispense Refill    meclizine 12.5 MG Oral Tab Take 1 tablet (12.5 mg total) by mouth 3 (three) times daily as needed. 30 tablet 0    metRONIDAZOLE 0.75 % External Cream Apply 1  Application topically 2 (two) times daily. 45 g 1    methylPREDNISolone (MEDROL) 4 MG Oral Tablet Therapy Pack Dosepack: take as directed 1 each 0    HUMALOG 100 UNIT/ML Injection Solution 50 Units by Insulin pump route daily.      SIMVASTATIN 40 MG Oral Tab TAKE 1 TABLET BY MOUTH EVERY DAY 90 tablet 0    DEXCOM G6 SENSOR Does not apply Misc APPLY TO SKIN EVERY 10 DAYS 9 each 3    Glucose Blood (ONETOUCH VERIO) In Vitro Strip USE AS DIRECTED 3 TIMES A  strip 3    Continuous Blood Gluc Transmit (DEXCOM G6 TRANSMITTER) Does not apply Misc 1 Device by Does not apply route As Directed. 1 each 3    Acetone, Urine, Test (KETOSTIX) In Vitro Strip 1 strip by Does not apply route as needed. 30 each 1    Acetone, Urine, Test (KETOSTIX) In Vitro Strip 1 strip by Does not apply route as needed. 50 each 1    Continuous Blood Gluc  (DEXCOM G6 ) Does not apply Device 1 each by Other route as needed. USE AS DIRECTED 1 Device 0    NOVOLOG FLEXPEN 100 UNIT/ML Subcutaneous Solution Pen-injector TO USE PER SLIDING SCALE. MDD 20 UNITS. 30 mL 1    insulin glargine (BASAGLAR KWIKPEN) 100 UNIT/ML Subcutaneous Solution Pen-injector Inject 10 Units into the skin nightly. 15 mL 0    Continuous Blood Gluc Transmit (DEXCOM G6 TRANSMITTER) Does not apply Misc Change every 3 months 1 each 1    insulin glargine (LANTUS SOLOSTAR) 100 UNIT/ML Subcutaneous Solution Pen-injector Inject 12 Units into the skin nightly. 15 mL 1    Glucose Blood In Vitro Strip Use to test 8 times daily 800 each 3    Glucose Blood (ONETOUCH ULTRA BLUE) In Vitro Strip Test blood sugar 6 times daily. 600 strip 0    Calcium Carbonate-Vit D-Min (CALCIUM 1200) 5529-6352 MG-UNIT Oral Chew Tab Chew  by mouth.         Physical Exam  /66   Pulse 68   Resp 16   Ht 5' 4\" (1.626 m)   Wt 138 lb 9.6 oz (62.9 kg)   SpO2 100%   BMI 23.79 kg/m²   Constitutional: well developed, well nourished, in no apparent distress  HEENT: Normocephalic and atraumatic.  Tympanic membranes clear bilat, no significant cerumen accumulation to either ear.   Eyes: Conjunctivae are pink and moist without exudate or drainage. EOMI. PERRLA.  Neuro: A/Ox3. Cranial nerves II-XII intact. Opposition intact. Hand grasp (=) bilat. No nystagmus noted. Follows commands appropriately. Speech fluid.  Neck: Normal range of motion. Neck supple.   Lymphadenopathy: No cervical adenopathy.   Cardiovascular: Normal rate, regular rhythm.  No murmur.   Pulmonary/Chest: No respiratory distress. Effort normal. Breath sounds clear bilaterally. No wheezes, rhonchi or rales  Diabetic foot exam: Bilateral feet visually inspected and the appearance is normal, barefoot skin exam is normal, no wounds.  Bilateral barefoot monofilament exam completed and is within normal limits.  Pedal pulses assessed bilaterally and are within normal parameters.   Skin: Skin is warm and dry. No rash noted. No pallor. Mild erythema with some bumpy appearance noted to bilateral cheeks. No open areas, drainage or exudate.     A/P:    Encounter Diagnoses   Name Primary?    Facial rash- suspect rosacea. Consider lupus but appearance and symptoms not consistent. Manage with metronidazole topical and see Dr. Leach as referred. Verbalized understanding of instructions and agreeable to this plan of care.    Yes    Dizziness-  seems vertiginous in nature, neuro exam reassuring. Trial Meclizine, medication administration, use and side effects discussed.Do Rosales-Daroff exercises, handout provided and exercises explained. Instructed to notify office if not improved with these measures or if symptoms worsen. Verbalized understanding of instructions and agreeable to this plan of care.         Decreased hearing of left ear- referred to Dr. Sy for hearing eval.        Encounter for screening mammogram for malignant neoplasm of breast- check mammogram.       Total visit time was 32 minutes, including 27 minutes of face to face visit time and 5  minutes of documentation and chart review.     No orders of the defined types were placed in this encounter.      Meds & Refills for this Visit:  Requested Prescriptions     Signed Prescriptions Disp Refills    meclizine 12.5 MG Oral Tab 30 tablet 0     Sig: Take 1 tablet (12.5 mg total) by mouth 3 (three) times daily as needed.    metRONIDAZOLE 0.75 % External Cream 45 g 1     Sig: Apply 1 Application topically 2 (two) times daily.       Imaging & Consults:  DERM - INTERNAL  ENT - INTERNAL  RAQUEL DEB 2D+3D SCREENING BILAT (CPT=77067/15178)    No follow-ups on file.  There are no Patient Instructions on file for this visit.    All questions were answered and the patient understands the plan.

## 2024-09-24 LAB
A1C: 6.6 %
GFR NON-AFRICAN AMERICAN: 94.43
LDL CHOLESTEROL: 106 MG/DL (ref ?–130)
MICROALB/CREAT RATIO: <25.5 UG/MG CREAT

## 2025-05-14 ENCOUNTER — TELEPHONE (OUTPATIENT)
Dept: FAMILY MEDICINE CLINIC | Facility: CLINIC | Age: 65
End: 2025-05-14

## 2025-05-14 DIAGNOSIS — Z00.00 ROUTINE GENERAL MEDICAL EXAMINATION AT A HEALTH CARE FACILITY: Primary | ICD-10-CM

## 2025-05-14 NOTE — TELEPHONE ENCOUNTER
Pt called back and I told her that her mammogram is still good for one year that Floyd entered on 8/24/24

## 2025-05-14 NOTE — TELEPHONE ENCOUNTER
Please enter lab orders for the patient's upcoming physical appointment.     Physical scheduled:   Your appointments       Date & Time Appointment Department ([place])    Jun 17, 2025 12:30 PM CDT Medicare Annual Well Visit with [facility]              [facility]  [address]  [facility] [address]  [phone]           Preferred lab: [facility]     The patient has been notified to complete fasting labs prior to their physical appointment.

## 2025-05-14 NOTE — TELEPHONE ENCOUNTER
Patient is requesting an order for her annual screening mammogram.    Patient has been notified to allow 2-3 business days for order placement. Reviewed with patient that she may schedule mammogram via beprettyt or by calling Central Scheduling at that time.    Request for mammogram order routed to triage.

## 2025-06-05 ENCOUNTER — OFFICE VISIT (OUTPATIENT)
Dept: FAMILY MEDICINE CLINIC | Facility: CLINIC | Age: 65
End: 2025-06-05
Payer: MEDICARE

## 2025-06-05 VITALS — WEIGHT: 139 LBS | BODY MASS INDEX: 23.73 KG/M2 | RESPIRATION RATE: 18 BRPM | HEIGHT: 64 IN

## 2025-06-05 DIAGNOSIS — H91.93 DECREASED HEARING OF BOTH EARS: Primary | ICD-10-CM

## 2025-06-05 PROCEDURE — 99213 OFFICE O/P EST LOW 20 MIN: CPT | Performed by: NURSE PRACTITIONER

## 2025-06-05 RX ORDER — ALENDRONATE SODIUM 70 MG/1
70 TABLET ORAL
COMMUNITY
Start: 2025-04-26

## 2025-06-05 RX ORDER — FLUTICASONE PROPIONATE 50 MCG
1 SPRAY, SUSPENSION (ML) NASAL 2 TIMES DAILY
Qty: 11.1 ML | Refills: 0 | Status: SHIPPED | OUTPATIENT
Start: 2025-06-05 | End: 2025-06-12

## 2025-06-05 RX ORDER — LANCETS
EACH MISCELLANEOUS
COMMUNITY
Start: 2025-02-17

## 2025-06-05 NOTE — PROGRESS NOTES
Chief Complaint   Patient presents with    Hearing Problem     Per pt has been having some trouble hearing for the past year.        HPI:  Presents with 1 week history of sudden onset hearing loss after attending wedding with loud band. Reports has noted some mild hearing loss in the pat year L>R, but on ride home from wedding noted could hardly hear her  and his voice sounded higher pitched. Notes all sounds are now similarly distorted and hearing is diminished. Denies ear pain. Denies dizziness, loss of balance. Denies fever/chills, cough, sore throat, nasal/sinus congestion, headaches. Has not been treating with anything.     Past Medical History[1]    Problem List[2]    Current Medications[3]    Physical Exam  Resp 18   Ht 5' 4\" (1.626 m)   Wt 139 lb (63 kg)   BMI 23.86 kg/m²   Constitutional: well developed, well nourished, in no apparent distress  HEENT: Normocephalic and atraumatic. Tympanic membranes clear with bulging bilat, no erythema or air/fluid levels. Oropharynx is pink and moist without lesions. No significant rhinorrhea.   Eyes: Conjunctivae are pink and moist without drainage. EOMI.  Neck: Normal range of motion.  Lymphadenopathy: No cervical adenopathy.   Cardiovascular: Normal rate, regular rhythm.  No murmur.   Pulmonary/Chest: No respiratory distress. Effort normal. Breath sounds clear bilaterally. No wheezes, rhonchi or rales appreciated. No cough heard during exam.   Skin: Skin is warm and dry. No pallor. No rash noted.    A/P:    Encounter Diagnosis   Name Primary?    Decreased hearing of both ears- unclear etiology. Start Flonase BID. Complete hearing test tomorrow as scheduled. Message me with results and if no significant abnormality and no improvement with Flonase by 6/9, will prescribe low dose prednisone (20mg). Discussed significant impact of prednisone on glucoses as well as other side effects of prednisone, so will wait to prescribe as noted above. Referred to ENT as well  for further eval. Verbalized understanding of instructions and agreeable to this plan of care.     Yes       No orders of the defined types were placed in this encounter.      Meds & Refills for this Visit:  Requested Prescriptions     Signed Prescriptions Disp Refills    fluticasone propionate 50 MCG/ACT Nasal Suspension 11.1 mL 0     Si spray by Each Nare route 2 (two) times daily for 7 days.       Imaging & Consults:  ENT - INTERNAL    No follow-ups on file.  Patient Instructions                   Use Flonase, one spray each nostril, morning and evening.       All questions were answered and the patient understands the plan.            [1]   Past Medical History:   Arthritis    Body piercing    Diabetes mellitus (HCC)    Pain in joints    Type I (juvenile type) diabetes mellitus without mention of complication, not stated as uncontrolled   [2]   Patient Active Problem List  Diagnosis    Symptomatic menopausal or female climacteric states    Closed fracture of one or more phalanges of foot    Closed fracture of upper end of tibia    Unspecified internal derangement of knee    Type 1 diabetes (HCC)    Adhesive capsulitis, right    Patellofemoral joint pain, right    Type 1 diabetes mellitus with hyperglycemia (HCC)    Mixed hyperlipidemia    Menopausal state    Anemia    Special screening for malignant neoplasms, colon    Iron deficiency anemia, unspecified    Chronic superficial gastritis   [3]   Current Outpatient Medications   Medication Sig Dispense Refill    alendronate 70 MG Oral Tab Take 1 tablet (70 mg total) by mouth every 7 days.      Accu-Chek Softclix Lancets Does not apply Misc USE 1 TO CHECK GLUCOSE DAILY AS NEEDED      fluticasone propionate 50 MCG/ACT Nasal Suspension 1 spray by Each Nare route 2 (two) times daily for 7 days. 11.1 mL 0    meclizine 12.5 MG Oral Tab Take 1 tablet (12.5 mg total) by mouth 3 (three) times daily as needed. 30 tablet 0    metRONIDAZOLE 0.75 % External Cream Apply 1  Application topically 2 (two) times daily. 45 g 1    methylPREDNISolone (MEDROL) 4 MG Oral Tablet Therapy Pack Dosepack: take as directed 1 each 0    HUMALOG 100 UNIT/ML Injection Solution 50 Units by Insulin pump route daily.      SIMVASTATIN 40 MG Oral Tab TAKE 1 TABLET BY MOUTH EVERY DAY 90 tablet 0    DEXCOM G6 SENSOR Does not apply Misc APPLY TO SKIN EVERY 10 DAYS 9 each 3    Glucose Blood (ONETOUCH VERIO) In Vitro Strip USE AS DIRECTED 3 TIMES A  strip 3    Continuous Blood Gluc Transmit (DEXCOM G6 TRANSMITTER) Does not apply Misc 1 Device by Does not apply route As Directed. 1 each 3    Acetone, Urine, Test (KETOSTIX) In Vitro Strip 1 strip by Does not apply route as needed. 30 each 1    Acetone, Urine, Test (KETOSTIX) In Vitro Strip 1 strip by Does not apply route as needed. 50 each 1    Continuous Blood Gluc  (DEXCOM G6 ) Does not apply Device 1 each by Other route as needed. USE AS DIRECTED 1 Device 0    NOVOLOG FLEXPEN 100 UNIT/ML Subcutaneous Solution Pen-injector TO USE PER SLIDING SCALE. MDD 20 UNITS. 30 mL 1    insulin glargine (BASAGLAR KWIKPEN) 100 UNIT/ML Subcutaneous Solution Pen-injector Inject 10 Units into the skin nightly. 15 mL 0    Continuous Blood Gluc Transmit (DEXCOM G6 TRANSMITTER) Does not apply Misc Change every 3 months 1 each 1    insulin glargine (LANTUS SOLOSTAR) 100 UNIT/ML Subcutaneous Solution Pen-injector Inject 12 Units into the skin nightly. 15 mL 1    Glucose Blood In Vitro Strip Use to test 8 times daily 800 each 3    Glucose Blood (ONETOUCH ULTRA BLUE) In Vitro Strip Test blood sugar 6 times daily. 600 strip 0    Calcium Carbonate-Vit D-Min (CALCIUM 1200) 9642-0428 MG-UNIT Oral Chew Tab Chew  by mouth.

## 2025-06-06 ENCOUNTER — TELEPHONE (OUTPATIENT)
Dept: FAMILY MEDICINE CLINIC | Facility: CLINIC | Age: 65
End: 2025-06-06

## 2025-06-06 ENCOUNTER — PATIENT MESSAGE (OUTPATIENT)
Dept: FAMILY MEDICINE CLINIC | Facility: CLINIC | Age: 65
End: 2025-06-06

## 2025-06-06 DIAGNOSIS — H93.239 HYPERACUSIS, UNSPECIFIED LATERALITY: Primary | ICD-10-CM

## 2025-06-06 NOTE — TELEPHONE ENCOUNTER
Pt is calling because she wasn't able to get into the ENT that was referred to her yesterday but she can go to the [place] location on Tuesday she just needs a new referral

## 2025-06-10 ENCOUNTER — OFFICE VISIT (OUTPATIENT)
Facility: LOCATION | Age: 65
End: 2025-06-10
Payer: MEDICARE

## 2025-06-10 VITALS — HEIGHT: 64 IN | BODY MASS INDEX: 23.73 KG/M2 | WEIGHT: 139 LBS

## 2025-06-10 DIAGNOSIS — H83.3X3 ACOUSTIC TRAUMA OF BOTH EARS: Primary | ICD-10-CM

## 2025-06-10 DIAGNOSIS — H90.3 SENSORINEURAL HEARING LOSS (SNHL) OF BOTH EARS: ICD-10-CM

## 2025-06-10 PROCEDURE — 99203 OFFICE O/P NEW LOW 30 MIN: CPT | Performed by: STUDENT IN AN ORGANIZED HEALTH CARE EDUCATION/TRAINING PROGRAM

## 2025-06-10 PROCEDURE — 92504 EAR MICROSCOPY EXAMINATION: CPT | Performed by: STUDENT IN AN ORGANIZED HEALTH CARE EDUCATION/TRAINING PROGRAM

## 2025-06-10 RX ORDER — PREDNISONE 20 MG/1
20 TABLET ORAL DAILY
Qty: 7 TABLET | Refills: 0 | Status: SHIPPED | OUTPATIENT
Start: 2025-06-10 | End: 2025-06-17

## 2025-06-10 RX ORDER — MAGNESIUM GLYCINATE 100 MG
200 CAPSULE ORAL NIGHTLY
Qty: 60 CAPSULE | Refills: 2 | Status: SHIPPED | OUTPATIENT
Start: 2025-06-10

## 2025-06-10 NOTE — PROGRESS NOTES
The following individual(s) verbally consented to be recorded using ambient AI listening technology and understand that they can each withdraw their consent to this listening technology at any point by asking the clinician to turn off or pause the recording:  Yes to consent  Patient name: Ofelia Renee

## 2025-06-10 NOTE — PROGRESS NOTES
[facility]    [facility]25     Reason for Consultation:     Chief Complaint   Patient presents with    New Patient    Hearing Loss     Patient here for hearing loss of both ears. Pt had  hearing test at [facility] on 06/06/2025         History of Present Illness:     History of Present Illness  Ofelia Renee is a 64 year old female with diabetes who presents with acute hearing loss following exposure to loud music.    She experienced acute hearing loss after attending a wedding with loud music, noticing altered perception of her 's voice. Sounds are garbled, and she has difficulty discerning diction. Minimal improvement has been noted with Flonase. A hearing test on June 6th was failed. She has not had prior hearing tests but was aware of gradual hearing loss. Her family history includes significant hearing loss in her parents and a brother who wears hearing aids.    No ear pain is present, but all sounds are perceived differently, including music and car dings. She feels like she is 'talking underwater'. She is concerned about the impact on her daily life, especially with an upcoming move and a wedding in July.    Her diabetes is well-controlled with an A1c of 6.2-6.3, managed with an insulin pump. She previously experienced a significant increase in blood sugar levels with cortisone use for a frozen shoulder and is cautious about using steroids again.    Past Medical History  Past Medical History[1]    Past Surgical History  Past Surgical History[2]    Family History  Family History[3]    Social History  Pediatric History   Patient Parents    Not on file     Other Topics Concern     Service Not Asked    Blood Transfusions Not Asked    Caffeine Concern Yes     Comment: 2-3 cups of coffee daily    Occupational Exposure Not Asked    Hobby Hazards Not Asked    Sleep Concern Not Asked    Stress Concern Not Asked    Weight Concern Not Asked    Special Diet Not Asked    Back  Care Not Asked    Exercise Yes     Comment: walks daily, rides horses    Bike Helmet Not Asked    Seat Belt Yes    Self-Exams Not Asked   Social History Narrative    Not on file           Current Medications:  Current Medications[4]    Allergies  Allergies[5]    Review of Systems:     A comprehensive 10 point review of systems was completed.  Pertinent positives and negatives noted in the the HPI.    Physical Exam:     Height 5' 4\" (1.626 m), weight 139 lb (63 kg), not currently breastfeeding.    GENERAL: No acute distress, Comfortable appearing  FACE: HB 1/6, Normal Animation  HEAD: Normocephalic  EYES: EOMI, pupils equil  EARS: Bilateral Auricles Symmetric, bilateral tympanic membranes appear normal  NOSE: Nares patent bilaterally  ORAL CAVITY: Tongue mobile, Oropharynx clear, Floor of mouth clear, Posterior oropharynx normal  NECK: No palpable lymphadenopathy, thyroid not palpable, nontender    Canals:  Right: Clear  Left: Clear    Tympanic Membranes:  Right: Normal tympanic membrane, with no retraction, middle ear space clear  Left: Normal tympanic membrane. with no retraction middle ear space clear    TM Visualized Method:   Right TM examined via otomicroscopy.   Left TM examined via otomicroscopy.      Results:     Laboratory Data:  Lab Results   Component Value Date    WBC 4.3 10/22/2021    HGB 12.2 10/22/2021    HCT 37.0 10/22/2021    .0 10/22/2021    CREATSERUM 0.86 10/21/2022    BUN 16 10/21/2022     10/21/2022    K 4.2 10/21/2022     10/21/2022    CO2 27.0 10/21/2022    GLU 76 10/21/2022    CA 9.8 10/21/2022    ALB 4.0 10/21/2022    ALKPHO 60 10/21/2022    TP 7.6 10/21/2022    AST 23 10/21/2022    ALT 27 10/21/2022    PTT 29.8 06/28/2019    INR 0.89 (L) 06/28/2019    PTP 12.4 (L) 06/28/2019    T4F 0.8 11/08/2019    TSH 1.920 10/22/2021    ESRML 21 10/22/2021    CRP <0.29 10/22/2021    TROP <0.045 06/28/2019         Imaging:  No results found.    Impression:       ICD-10-CM    1.  Acoustic trauma of both ears  H83.3X3       2. Sensorineural hearing loss (SNHL) of both ears  H90.3            Recommendations:       Hearing loss due to acoustic trauma  Acute hearing loss following exposure to loud music at a wedding in [facility], characterized by garbled sounds, inability to discern diction, and altered pitch perception. No pain reported. Eardrums appear healthy, suggesting cochlear or auditory nerve involvement. Mild improvement with Flonase. Discussed prednisone for inflammation reduction and hearing restoration, considering her diabetes management and previous steroid experience. Prednisone may increase blood sugar levels, but alternative options are limited.  - Prescribe a 7-day course of medium-dose prednisone.  - Continue Flonase.  - Start magnesium glycinate supplementation at night.  - Schedule follow-up appointment in 1-2 weeks for repeat hearing test and assessment.    Type 1 diabetes mellitus  Well-controlled with A1c levels around 6.2-6.3. She uses an insulin pump with automatic insulin delivery adjustments. Discussed prednisone's potential to increase blood sugar levels and the need for monitoring and insulin ratio adjustments. She is knowledgeable and comfortable managing these changes.  - Monitor blood sugar levels closely during prednisone treatment.  - Adjust insulin ratios as needed to maintain blood sugar control.  - Contact provider if blood sugar levels exceed 250 mg/dL.       Thank you for allowing me to participate in the care of your patient.    Ricardo Flynn,    Otolaryngology/Rhinology, Sinus, and Endoscopic Skull Base Surgery  [facility]   [address] [place] [address] [address] [place] [id number]  Phone [phone]  Fax [phone]  6/10/2025  11:04 AM  6/10/2025          [1]   Past Medical History:   Arthritis    Body piercing    Diabetes mellitus (HCC)    Pain in joints    Type I (juvenile type) diabetes mellitus without mention of complication,  not stated as uncontrolled   [2]   Past Surgical History:  Procedure Laterality Date    Lumpectomy right  1/1/92    Sven biopsy stereo nodule 1 site right (cpt=19081)  05/2008   [3]   Family History  Problem Relation Age of Onset    Hypertension Mother     Other (acute MI) Mother    [4]   Current Outpatient Medications   Medication Sig Dispense Refill    predniSONE 20 MG Oral Tab Take 1 tablet (20 mg total) by mouth daily for 7 days. 7 tablet 0    alendronate 70 MG Oral Tab Take 1 tablet (70 mg total) by mouth every 7 days.      Accu-Chek Softclix Lancets Does not apply Misc USE 1 TO CHECK GLUCOSE DAILY AS NEEDED      fluticasone propionate 50 MCG/ACT Nasal Suspension 1 spray by Each Nare route 2 (two) times daily for 7 days. 11.1 mL 0    meclizine 12.5 MG Oral Tab Take 1 tablet (12.5 mg total) by mouth 3 (three) times daily as needed. 30 tablet 0    metRONIDAZOLE 0.75 % External Cream Apply 1 Application topically 2 (two) times daily. 45 g 1    HUMALOG 100 UNIT/ML Injection Solution 50 Units by Insulin pump route daily.      SIMVASTATIN 40 MG Oral Tab TAKE 1 TABLET BY MOUTH EVERY DAY 90 tablet 0    DEXCOM G6 SENSOR Does not apply Misc APPLY TO SKIN EVERY 10 DAYS 9 each 3    Acetone, Urine, Test (KETOSTIX) In Vitro Strip 1 strip by Does not apply route as needed. 30 each 1    Continuous Blood Gluc  (DEXCOM G6 ) Does not apply Device 1 each by Other route as needed. USE AS DIRECTED 1 Device 0    NOVOLOG FLEXPEN 100 UNIT/ML Subcutaneous Solution Pen-injector TO USE PER SLIDING SCALE. MDD 20 UNITS. 30 mL 1    insulin glargine (BASAGLAR KWIKPEN) 100 UNIT/ML Subcutaneous Solution Pen-injector Inject 10 Units into the skin nightly. 15 mL 0    Calcium Carbonate-Vit D-Min (CALCIUM 1200) 7816-2336 MG-UNIT Oral Chew Tab Chew  by mouth.      methylPREDNISolone (MEDROL) 4 MG Oral Tablet Therapy Pack Dosepack: take as directed (Patient not taking: Reported on 6/10/2025) 1 each 0    Glucose Blood (ONETOUCH VERIO)  In Vitro Strip USE AS DIRECTED 3 TIMES A  strip 3    Continuous Blood Gluc Transmit (DEXCOM G6 TRANSMITTER) Does not apply Misc 1 Device by Does not apply route As Directed. 1 each 3    Acetone, Urine, Test (KETOSTIX) In Vitro Strip 1 strip by Does not apply route as needed. 50 each 1    Continuous Blood Gluc Transmit (DEXCOM G6 TRANSMITTER) Does not apply Misc Change every 3 months 1 each 1    insulin glargine (LANTUS SOLOSTAR) 100 UNIT/ML Subcutaneous Solution Pen-injector Inject 12 Units into the skin nightly. 15 mL 1    Glucose Blood In Vitro Strip Use to test 8 times daily 800 each 3    Glucose Blood (ONETOUCH ULTRA BLUE) In Vitro Strip Test blood sugar 6 times daily. 600 strip 0   [5] No Known Allergies

## 2025-06-11 ENCOUNTER — PATIENT OUTREACH (OUTPATIENT)
Dept: FAMILY MEDICINE CLINIC | Facility: CLINIC | Age: 65
End: 2025-06-11

## 2025-06-12 ENCOUNTER — PATIENT MESSAGE (OUTPATIENT)
Dept: FAMILY MEDICINE CLINIC | Facility: CLINIC | Age: 65
End: 2025-06-12

## 2025-06-15 LAB
ALBUMIN/GLOBULIN RATIO: 1.7 (CALC) (ref 1–2.5)
ALBUMIN: 4.5 G/DL (ref 3.6–5.1)
ALKALINE PHOSPHATASE: 51 U/L (ref 37–153)
ALT: 19 U/L (ref 6–29)
AST: 28 U/L (ref 10–35)
BILIRUBIN, TOTAL: 0.6 MG/DL (ref 0.2–1.2)
BUN: 17 MG/DL (ref 7–25)
CALCIUM: 9.3 MG/DL (ref 8.6–10.4)
CARBON DIOXIDE: 27 MMOL/L (ref 20–32)
CHLORIDE: 101 MMOL/L (ref 98–110)
CHOL/HDLC RATIO: 2.1 (CALC)
CHOLESTEROL, TOTAL: 254 MG/DL
CREATININE, RANDOM URINE: 139 MG/DL (ref 20–275)
CREATININE: 0.83 MG/DL (ref 0.5–1.05)
EGFR: 78 ML/MIN/1.73M2
GLOBULIN: 2.7 G/DL (CALC) (ref 1.9–3.7)
GLUCOSE: 86 MG/DL (ref 65–99)
HDL CHOLESTEROL: 121 MG/DL
HEMATOCRIT: 32.8 % (ref 35–45)
HEMOGLOBIN A1C: 6.6 %
HEMOGLOBIN: 10.9 G/DL (ref 11.7–15.5)
LDL-CHOLESTEROL: 120 MG/DL (CALC)
MCH: 33.5 PG (ref 27–33)
MCHC: 33.2 G/DL (ref 32–36)
MCV: 100.9 FL (ref 80–100)
MICROALBUMIN/CREATININE RATIO, RANDOM URINE: 2 MG/G CREAT
MICROALBUMIN: 0.3 MG/DL
MPV: 10.5 FL (ref 7.5–12.5)
NON-HDL CHOLESTEROL: 133 MG/DL (CALC)
PLATELET COUNT: 297 THOUSAND/UL (ref 140–400)
POTASSIUM: 3.8 MMOL/L (ref 3.5–5.3)
PROTEIN, TOTAL: 7.2 G/DL (ref 6.1–8.1)
RDW: 11.8 % (ref 11–15)
RED BLOOD CELL COUNT: 3.25 MILLION/UL (ref 3.8–5.1)
SODIUM: 137 MMOL/L (ref 135–146)
TRIGLYCERIDES: 43 MG/DL
TSH W/REFLEX TO FT4: 3.14 MIU/L (ref 0.4–4.5)
WHITE BLOOD CELL COUNT: 5.9 THOUSAND/UL (ref 3.8–10.8)

## 2025-06-17 ENCOUNTER — OFFICE VISIT (OUTPATIENT)
Facility: LOCATION | Age: 65
End: 2025-06-17

## 2025-06-17 ENCOUNTER — OFFICE VISIT (OUTPATIENT)
Facility: LOCATION | Age: 65
End: 2025-06-17
Payer: MEDICARE

## 2025-06-17 ENCOUNTER — OFFICE VISIT (OUTPATIENT)
Dept: FAMILY MEDICINE CLINIC | Facility: CLINIC | Age: 65
End: 2025-06-17
Payer: MEDICARE

## 2025-06-17 VITALS
DIASTOLIC BLOOD PRESSURE: 76 MMHG | TEMPERATURE: 98 F | HEIGHT: 64 IN | SYSTOLIC BLOOD PRESSURE: 142 MMHG | RESPIRATION RATE: 16 BRPM | WEIGHT: 136.81 LBS | BODY MASS INDEX: 23.36 KG/M2

## 2025-06-17 DIAGNOSIS — H91.90 HEARING LOSS, UNSPECIFIED HEARING LOSS TYPE, UNSPECIFIED LATERALITY: Primary | ICD-10-CM

## 2025-06-17 DIAGNOSIS — E10.9 TYPE 1 DIABETES MELLITUS WITHOUT COMPLICATION (HCC): ICD-10-CM

## 2025-06-17 DIAGNOSIS — H90.3 SENSORINEURAL HEARING LOSS, BILATERAL: Primary | ICD-10-CM

## 2025-06-17 DIAGNOSIS — H93.13 TINNITUS OF BOTH EARS: ICD-10-CM

## 2025-06-17 DIAGNOSIS — E78.2 MIXED HYPERLIPIDEMIA: ICD-10-CM

## 2025-06-17 DIAGNOSIS — Z00.00 ENCOUNTER FOR ANNUAL HEALTH EXAMINATION: Primary | ICD-10-CM

## 2025-06-17 DIAGNOSIS — D64.9 ANEMIA, UNSPECIFIED TYPE: ICD-10-CM

## 2025-06-17 PROBLEM — Z12.11 SPECIAL SCREENING FOR MALIGNANT NEOPLASMS, COLON: Status: RESOLVED | Noted: 2019-09-30 | Resolved: 2025-06-17

## 2025-06-17 PROCEDURE — 99213 OFFICE O/P EST LOW 20 MIN: CPT | Performed by: STUDENT IN AN ORGANIZED HEALTH CARE EDUCATION/TRAINING PROGRAM

## 2025-06-17 PROCEDURE — 92557 COMPREHENSIVE HEARING TEST: CPT | Performed by: AUDIOLOGIST

## 2025-06-17 PROCEDURE — 92567 TYMPANOMETRY: CPT | Performed by: AUDIOLOGIST

## 2025-06-17 PROCEDURE — 92504 EAR MICROSCOPY EXAMINATION: CPT | Performed by: STUDENT IN AN ORGANIZED HEALTH CARE EDUCATION/TRAINING PROGRAM

## 2025-06-17 PROCEDURE — G0439 PPPS, SUBSEQ VISIT: HCPCS | Performed by: FAMILY MEDICINE

## 2025-06-17 NOTE — PROGRESS NOTES
[facility]  OTOLARYNGOLOGY - HEAD & NECK SURGERY    6/17/2025     Reason for Consultation:     Chief Complaint   Patient presents with    Follow - Up     Patient is here due to acoustic trama in both ears follow up , reports no improvement in hearing , reports having a hearing test x 2 weeks ago/.         History of Present Illness:     History of Present Illness  Ofelia Renee is a 64 year old female with diabetes who presents with acute hearing loss following exposure to loud music.    She experienced acute hearing loss after attending a wedding with loud music, noticing altered perception of her 's voice. Sounds are garbled, and she has difficulty discerning diction. Minimal improvement has been noted with Flonase. A hearing test on June 6th was failed. She has not had prior hearing tests but was aware of gradual hearing loss. Her family history includes significant hearing loss in her parents and a brother who wears hearing aids.    No ear pain is present, but all sounds are perceived differently, including music and car dings. She feels like she is 'talking underwater'. She is concerned about the impact on her daily life, especially with an upcoming move and a wedding in July.    Her diabetes is well-controlled with an A1c of 6.2-6.3, managed with an insulin pump. She previously experienced a significant increase in blood sugar levels with cortisone use for a frozen shoulder and is cautious about using steroids again.    6/17/2025  Ofelia Renee is a 64 year old female who presents with persistent tinnitus and fluctuating hearing ability.    She experiences a persistent, irritating ringing in her ears and fluctuating hearing ability. She is unsure if the fluctuations are due to changes in her hearing or environmental variations. She attempts to alleviate symptoms by yawning to 'pop' her ears but is unsuccessful. A previous hearing test at [facility] indicated hearing loss. She perceives her own voice and  others' voices as sounding like 'elves' and occasionally has difficulty recognizing her name when called.    Past Medical History  Past Medical History[1]    Past Surgical History  Past Surgical History[2]    Family History  Family History[3]    Social History  Pediatric History   Patient Parents    Not on file     Other Topics Concern     Service Not Asked    Blood Transfusions Not Asked    Caffeine Concern Yes     Comment: 2-3 cups of coffee daily    Occupational Exposure Not Asked    Hobby Hazards Not Asked    Sleep Concern Not Asked    Stress Concern Not Asked    Weight Concern Not Asked    Special Diet Not Asked    Back Care Not Asked    Exercise Yes     Comment: walks daily, rides horses    Bike Helmet Not Asked    Seat Belt Yes    Self-Exams Not Asked   Social History Narrative    Not on file           Current Medications:  Current Medications[4]    Allergies  Allergies[5]    Review of Systems:     A comprehensive 10 point review of systems was completed.  Pertinent positives and negatives noted in the the HPI.    Physical Exam:     not currently breastfeeding.    GENERAL: No acute distress, Comfortable appearing  FACE: HB 1/6, Normal Animation  HEAD: Normocephalic  EYES: EOMI, pupils equil  EARS: Bilateral Auricles Symmetric, bilateral tympanic membranes appear normal  NOSE: Nares patent bilaterally  ORAL CAVITY: Tongue mobile, Oropharynx clear, Floor of mouth clear, Posterior oropharynx normal  NECK: No palpable lymphadenopathy, thyroid not palpable, nontender    Canals:  Right: Clear  Left: Clear    Tympanic Membranes:  Right: Normal tympanic membrane, with no retraction, middle ear space clear  Left: Normal tympanic membrane. with no retraction middle ear space clear    TM Visualized Method:   Right TM examined via otomicroscopy.   Left TM examined via otomicroscopy.      Results:     Laboratory Data:  Lab Results   Component Value Date    WBC 5.9 06/14/2025    HGB 10.9 (L) 06/14/2025    HCT 32.8  (L) 06/14/2025     06/14/2025    CREATSERUM 0.83 06/14/2025    BUN 17 06/14/2025     06/14/2025    K 3.8 06/14/2025     06/14/2025    CO2 27 06/14/2025    GLU 86 06/14/2025    CA 9.3 06/14/2025    ALB 4.5 06/14/2025    ALKPHO 51 06/14/2025    TP 7.2 06/14/2025    AST 28 06/14/2025    ALT 19 06/14/2025    PTT 29.8 06/28/2019    INR 0.89 (L) 06/28/2019    PTP 12.4 (L) 06/28/2019    T4F 0.8 11/08/2019    TSH 1.920 10/22/2021    ESRML 21 10/22/2021    CRP <0.29 10/22/2021    TROP <0.045 06/28/2019         Imaging:  No results found.    Impression:       ICD-10-CM    1. Acoustic trauma of both ears  H83.3X3       2. Sensorineural hearing loss (SNHL) of both ears  H90.3            Recommendations:       Sensorineural hearing loss, bilateral  Bilateral sensorineural hearing loss with moderate to severe loss in upper mid-range and high frequencies. Hearing fluctuates without significant improvement. Word recognition scores are 84% on the right and 76% on the left. Tympanic membrane pressure is normal, indicating healthy eardrums. Differential includes acoustic trauma as a potential cause. MRI of the inner ears is planned to rule out other causes. Hearing aids are recommended to improve hearing and alleviate tinnitus.  - Order MRI of the inner ears to rule out other causes of hearing loss, given the patient had sudden onset of symptoms, and has what she describes as garbled sounds and that everyone sounds \"high-pitched\"  - Provide clearance for hearing aids.  - Recommend hearing aid consultation  - Advise starting the process of obtaining hearing aids and scheduling MRI simultaneously.    Tinnitus  Persistent tinnitus that is significantly bothersome. Hearing aids may help alleviate the ringing associated with tinnitus.  - Recommend hearing aids to help with tinnitus.       Thank you for allowing me to participate in the care of your patient.    Ricardo Flynn, DO   Otolaryngology/Rhinology, Sinus, and  Endoscopic Skull Base Surgery  [facility]   [address] [place] [address] [address]  [place] [address]  Phone [phone]  Fax [phone]  6/10/2025  11:04 AM  6/17/2025          [1]   Past Medical History:   Arthritis    Body piercing    Diabetes mellitus (HCC)    Pain in joints    Type I (juvenile type) diabetes mellitus without mention of complication, not stated as uncontrolled   [2]   Past Surgical History:  Procedure Laterality Date    Lumpectomy right  1/1/92    Sven biopsy stereo nodule 1 site right (cpt=19081)  05/2008   [3]   Family History  Problem Relation Age of Onset    Hypertension Mother     Other (acute MI) Mother    [4]   Current Outpatient Medications   Medication Sig Dispense Refill    predniSONE 20 MG Oral Tab Take 1 tablet (20 mg total) by mouth daily for 7 days. 7 tablet 0    Magnesium Glycinate 100 MG Oral Cap Take 200 mg by mouth at bedtime. 60 capsule 2    alendronate 70 MG Oral Tab Take 1 tablet (70 mg total) by mouth every 7 days.      Accu-Chek Softclix Lancets Does not apply Misc USE 1 TO CHECK GLUCOSE DAILY AS NEEDED      meclizine 12.5 MG Oral Tab Take 1 tablet (12.5 mg total) by mouth 3 (three) times daily as needed. 30 tablet 0    metRONIDAZOLE 0.75 % External Cream Apply 1 Application topically 2 (two) times daily. 45 g 1    HUMALOG 100 UNIT/ML Injection Solution 50 Units by Insulin pump route daily.      SIMVASTATIN 40 MG Oral Tab TAKE 1 TABLET BY MOUTH EVERY DAY 90 tablet 0    Acetone, Urine, Test (KETOSTIX) In Vitro Strip 1 strip by Does not apply route as needed. 30 each 1    Continuous Blood Gluc  (DEXCOM G6 ) Does not apply Device 1 each by Other route as needed. USE AS DIRECTED 1 Device 0    NOVOLOG FLEXPEN 100 UNIT/ML Subcutaneous Solution Pen-injector TO USE PER SLIDING SCALE. MDD 20 UNITS. 30 mL 1    insulin glargine (BASAGLAR KWIKPEN) 100 UNIT/ML Subcutaneous Solution Pen-injector Inject 10 Units into the skin nightly. 15 mL 0    Calcium  Carbonate-Vit D-Min (CALCIUM 1200) 8454-8280 MG-UNIT Oral Chew Tab Chew  by mouth.      methylPREDNISolone (MEDROL) 4 MG Oral Tablet Therapy Pack Dosepack: take as directed (Patient not taking: Reported on 6/10/2025) 1 each 0    DEXCOM G6 SENSOR Does not apply Misc APPLY TO SKIN EVERY 10 DAYS 9 each 3    Glucose Blood (ONETOUCH VERIO) In Vitro Strip USE AS DIRECTED 3 TIMES A  strip 3    Continuous Blood Gluc Transmit (DEXCOM G6 TRANSMITTER) Does not apply Misc 1 Device by Does not apply route As Directed. 1 each 3    Acetone, Urine, Test (KETOSTIX) In Vitro Strip 1 strip by Does not apply route as needed. 50 each 1    Continuous Blood Gluc Transmit (DEXCOM G6 TRANSMITTER) Does not apply Misc Change every 3 months 1 each 1    insulin glargine (LANTUS SOLOSTAR) 100 UNIT/ML Subcutaneous Solution Pen-injector Inject 12 Units into the skin nightly. 15 mL 1    Glucose Blood In Vitro Strip Use to test 8 times daily 800 each 3    Glucose Blood (ONETOUCH ULTRA BLUE) In Vitro Strip Test blood sugar 6 times daily. 600 strip 0   [5] No Known Allergies

## 2025-06-17 NOTE — PROGRESS NOTES
Subjective:   Ofelia Renee is a 64 year old female who presents for a Medicare Wellness Visit charge within the last 11 months and Patient may not meet criteria for AWV: Please evaluate for correct coding and scheduled follow up of multiple significant but stable problems.           DM:  managed by endo.  Uses insulin pump. A1c controlled.     Lipids:  simvastatin per endo    Osteoporosis:  Fosamax per endocrine    Anemia:  Hgb 10.9, .9.  saw hem in 2019 for anemia.  EGD showed gastritis at that time.     Sven:  9/2021 - scheduled  DEXA:  10/2023  Colon:  9/2019 - repeat in 10 years    Retiring and moving to [place] 7/1/25    History/Other:   Fall Risk Assessment:   She has been screened for Falls and is low risk.      Cognitive Assessment:   Abnormal  What day of the week is this?: Correct  What month is it?: Correct  What year is it?: Correct  Recall \"Ball\": Incorrect  Recall \"Flag\": Incorrect  Recall \"Tree\": Correct    Functional Ability/Status:   Ofelia Renee has some abnormal functions as listed below:  She has Hearing problems based on screening of functional status.She has Vision problems based on screening of functional status. She has problems with Memory based on screening of functional status.       Depression Screening (PHQ):  PHQ-9 TOTAL SCORE: 6  , done 6/17/2025   Little interest or pleasure in doing things: 1    Feeling down, depressed, or hopeless: 1    Trouble falling or staying asleep, or sleeping too much: 1     Trouble concentrating on things, such as reading the newspaper or watching television: 3    If you checked off any problems, how difficult have these problems made it for you to do your work, take care of things at home, or get along with other people?: Somewhat difficult    Advanced Directives:   She does NOT have a Living Will. [Do you have a living will?: No]  She does NOT have a Power of  for Health Care. [Do you have a healthcare power of ?:  No]      Problem List[1]  Allergies:  She has no known allergies.    Current Medications:  Active Meds, Sig Only[2]    Medical History:  She  has a past medical history of Arthritis (In hands, recently noticed), Body piercing (ears), Diabetes mellitus (HCC) (1982), Pain in joints, and Type I (juvenile type) diabetes mellitus without mention of complication, not stated as uncontrolled.  Surgical History:  She  has a past surgical history that includes lumpectomy right (1/1/92) and shelia biopsy stereo nodule 1 site right (cpt=19081) (05/2008).   Family History:  Her family history includes Hypertension in her mother; acute MI in her mother.  Social History:  She  reports that she quit smoking about 21 years ago. Her smoking use included cigarettes. She has never used smokeless tobacco. She reports current alcohol use. She reports that she does not use drugs.    Tobacco:  She smoked tobacco in the past but quit greater than 12 months ago.  Tobacco Use[3]     CAGE Alcohol Screen:   She has been screened for alcohol abuse and her score is not 0:  Cut: Have you ever felt you should Cut down on your drinking?: Yes  Annoyed: Have people Annoyed you by criticizing your drinking?: No  Guilty: Have you ever felt bad or Guilty about your drinking?: No  Eye Opener: Have you ever had a drink first thing in the morning to steady your nerves or to get rid of a hangover (Eye opener)?: No  Total Score: 1      Patient Care Team:  Maribell Yost MD as PCP - General (Family Practice)  Richard Lopez (OPTOMETRY)    Review of Systems  GEN:  No fever or fatigue  HEENT:  No vision or hearing concerns.  No dental problems.  HEART:  No chest pain or palpitations  LUNG:  No SOB, cough or wheeze  GI:  No abdominal pain.  No N/V/D/C  :  No dysuria  EXT:  No joint pain.  No LE swelling  PSYCH:  No mood concerns or anxiety    Objective:   Physical Exam  GEN:  Alert, NAD  HEENT:  TMs normal  NECK:  No LAD, no thyromegaly  HEART:  RRR, no MRGs  LUNG:   CTA bilaterally, no RRWs  AB:  Soft, nontender, nondistended.  No palpable masses  EXT: no pedal edema  PSYCH:  Mood appropriate    /76   Temp 97.7 °F (36.5 °C)   Resp 16   Ht 5' 4\" (1.626 m)   Wt 136 lb 12.8 oz (62.1 kg)   BMI 23.48 kg/m²  Estimated body mass index is 23.48 kg/m² as calculated from the following:    Height as of this encounter: 5' 4\" (1.626 m).    Weight as of this encounter: 136 lb 12.8 oz (62.1 kg).    Medicare Hearing Assessment:   Hearing Screening    Screening Method: Whisper Test  Whisper Test Result: Pass         Visual Acuity:   Right Eye Visual Acuity: Uncorrected Right Eye Chart Acuity: 20/50   Left Eye Visual Acuity: Uncorrected Left Eye Chart Acuity: 20/40   Both Eyes Visual Acuity: Uncorrected Both Eyes Chart Acuity: 20/30   Able To Tolerate Visual Acuity: Yes        Assessment & Plan:   Ofelia Renee is a 64 year old female who presents for a Medicare Assessment.     1. Encounter for annual health examination    2. Type 1 diabetes mellitus without complication (HCC)  Due for eye exam  - Ophthalmology Referral - In Network    3. Anemia, unspecified type  F/u labs ordered  - CBC With Differential With Platelet  - Iron And Tibc  - Ferritin  - Vitamin B12  - Folic Acid Serum (Folate)    4. Mixed hyperlipidemia  Controlled with statin              The patient indicates understanding of these issues and agrees to the plan.      Enouraged establishing with PCP in [place]..     Maribell Yost MD    Supplementary Documentation:   General Health:  In the past six months, have you lost more than 10 pounds without trying?: 2 - No  Has your appetite been poor?: No  Type of Diet: Balanced  How does the patient maintain a good energy level?: Appropriate Exercise  How would you describe your daily physical activity?: Moderate  How would you describe your current health state?: Fair  How do you maintain positive mental well-being?: Social Interaction, Puzzles, Games, Visiting  Friends, Visiting Family  On a scale of 0 to 10, with 0 being no pain and 10 being severe pain, what is your pain level?: 0 - (None)  In the past six months, have you experienced urine leakage?: 0-No  At any time do you feel concerned for the safety/well-being of yourself and/or your children, in your home or elsewhere?: No  Have you had any immunizations at another office such as Influenza, Hepatitis B, Tetanus, or Pneumococcal?: No    Health Maintenance   Topic Date Due    Annual Physical  Never done    Pneumococcal Vaccine: 50+ Years (1 of 2 - PCV) Never done    Zoster Vaccines (1 of 2) Never done    Diabetes Care Dilated Eye Exam  2020    Mammogram  2022    DEXA Scan  2023    COVID-19 Vaccine (3 - 2024-25 season) 2024    Annual Depression Screening  2025    Diabetes Care Foot Exam  2025    Influenza Vaccine (Season Ended) 10/01/2025    Diabetes Care A1C  2025    Diabetes Care: GFR  2026    Colorectal Cancer Screening  2029    Fall Risk Screening (Annual)  Completed    Diabetes Care: Microalb/Creat Ratio (Annual)  Completed    Meningococcal B Vaccine  Aged Out            [1]   Patient Active Problem List  Diagnosis    Symptomatic menopausal or female climacteric states    Type 1 diabetes (HCC)    Patellofemoral joint pain, right    Mixed hyperlipidemia    Menopausal state    Anemia    Iron deficiency anemia, unspecified    Chronic superficial gastritis    Sensorineural hearing loss, bilateral   [2]   No outpatient medications have been marked as taking for the 25 encounter (Office Visit) with Maribell Yost MD.   [3]   Social History  Tobacco Use   Smoking Status Former    Current packs/day: 0.00    Types: Cigarettes    Quit date: 2004    Years since quittin.0   Smokeless Tobacco Never

## 2025-06-17 NOTE — PROGRESS NOTES
The following individual(s) verbally consented to be recorded using ambient AI listening technology and understand that they can each withdraw their consent to this listening technology at any point by asking the clinician to turn off or pause the recording: yes patient consents     Patient name: Ofelia Renee

## 2025-06-19 ENCOUNTER — HOSPITAL ENCOUNTER (OUTPATIENT)
Dept: MAMMOGRAPHY | Age: 65
Discharge: HOME | End: 2025-06-19
Attending: NURSE PRACTITIONER
Payer: MEDICARE

## 2025-06-19 DIAGNOSIS — Z12.31 ENCOUNTER FOR SCREENING MAMMOGRAM FOR MALIGNANT NEOPLASM OF BREAST: ICD-10-CM

## 2025-06-19 LAB
% SATURATION: 25 % (CALC) (ref 16–45)
ABSOLUTE BASOPHILS: 32 CELLS/UL (ref 0–200)
ABSOLUTE EOSINOPHILS: 80 CELLS/UL (ref 15–500)
ABSOLUTE LYMPHOCYTES: 1643 CELLS/UL (ref 850–3900)
ABSOLUTE MONOCYTES: 567 CELLS/UL (ref 200–950)
ABSOLUTE NEUTROPHILS: 2979 CELLS/UL (ref 1500–7800)
BASOPHILS: 0.6 %
EOSINOPHILS: 1.5 %
FERRITIN: 44 NG/ML (ref 16–288)
FOLATE, SERUM: 9.3 NG/ML
HEMATOCRIT: 36.2 % (ref 35–45)
HEMOGLOBIN: 12.1 G/DL (ref 11.7–15.5)
IRON BINDING CAPACITY: 284 MCG/DL (CALC) (ref 250–450)
IRON, TOTAL: 71 MCG/DL (ref 45–160)
LYMPHOCYTES: 31 %
MCH: 33.6 PG (ref 27–33)
MCHC: 33.4 G/DL (ref 32–36)
MCV: 100.6 FL (ref 80–100)
MONOCYTES: 10.7 %
MPV: 10.6 FL (ref 7.5–12.5)
NEUTROPHILS: 56.2 %
PLATELET COUNT: 298 THOUSAND/UL (ref 140–400)
RDW: 11.3 % (ref 11–15)
RED BLOOD CELL COUNT: 3.6 MILLION/UL (ref 3.8–5.1)
VITAMIN B12: 311 PG/ML (ref 200–1100)
WHITE BLOOD CELL COUNT: 5.3 THOUSAND/UL (ref 3.8–10.8)

## 2025-06-19 PROCEDURE — 77063 BREAST TOMOSYNTHESIS BI: CPT | Performed by: NURSE PRACTITIONER

## 2025-06-19 PROCEDURE — 77067 SCR MAMMO BI INCL CAD: CPT | Performed by: NURSE PRACTITIONER

## 2025-06-20 ENCOUNTER — PATIENT MESSAGE (OUTPATIENT)
Dept: FAMILY MEDICINE CLINIC | Facility: CLINIC | Age: 65
End: 2025-06-20

## 2025-06-24 ENCOUNTER — PATIENT OUTREACH (OUTPATIENT)
Dept: FAMILY MEDICINE CLINIC | Facility: CLINIC | Age: 65
End: 2025-06-24

## 2025-06-25 ENCOUNTER — HOSPITAL ENCOUNTER (OUTPATIENT)
Dept: MRI IMAGING | Age: 65
Discharge: HOME | End: 2025-06-25
Attending: STUDENT IN AN ORGANIZED HEALTH CARE EDUCATION/TRAINING PROGRAM
Payer: MEDICARE

## 2025-06-25 DIAGNOSIS — H91.90 HEARING LOSS, UNSPECIFIED HEARING LOSS TYPE, UNSPECIFIED LATERALITY: ICD-10-CM

## 2025-06-25 PROCEDURE — 70553 MRI BRAIN STEM W/O & W/DYE: CPT | Performed by: STUDENT IN AN ORGANIZED HEALTH CARE EDUCATION/TRAINING PROGRAM

## 2025-06-25 PROCEDURE — A9575 INJ GADOTERATE MEGLUMI 0.1ML: HCPCS | Performed by: STUDENT IN AN ORGANIZED HEALTH CARE EDUCATION/TRAINING PROGRAM

## 2025-06-25 RX ORDER — GADOTERATE MEGLUMINE 376.9 MG/ML
15 INJECTION INTRAVENOUS
Status: DISCONTINUED | OUTPATIENT
Start: 2025-06-25 | End: 2025-06-25

## 2025-06-25 RX ORDER — GADOTERATE MEGLUMINE 376.9 MG/ML
15 INJECTION INTRAVENOUS
Status: COMPLETED | OUTPATIENT
Start: 2025-06-25 | End: 2025-06-25

## 2025-06-25 RX ADMIN — GADOTERATE MEGLUMINE 13 ML: 376.9 INJECTION INTRAVENOUS at 15:21:00

## (undated) NOTE — ED AVS SNAPSHOT
Clifford Marie   MRN: [id number]    Department:  [facility] Emergency Department   Date of Visit:  6/28/2019           Disclosure     Insurance plans vary and the physician(s) referred by the ER may not be covered by your plan.  Please contact your tell this physician (or your personal doctor if your instructions are to return to your personal doctor) about any new or lasting problems. The primary care or specialist physician will see patients referred from the [facility] Emergency Department.  August Wilson

## (undated) NOTE — MR AVS SNAPSHOT
[facility] [place]  [place],  [address]  [address] [place] [id number] [phone]               Thank you for choosing us for [facility].   We are glad to serve you and happy to provide you with this MD [address] [address]   Phone:  [phone]   Fax:  [phone]         Referral Orders      Normal Orders This Visit    MARIE [employer] Angy Rodriguez (INTERNAL) [[id number] CPT(R)]  Order #:  [id number] office. At that time, you will be provided with any authorization numbers or be assured that none are required. You can then schedule your appointment. Failure to obtain required authorization numbers can create reimbursement difficulties for you.     Assoc Most of the time mechanical problems with the muscles or spine cause the pain. it is usually caused by an injury, whether known or not, to the muscles or ligaments. While illnesses can cause back pain, it is usually not caused by a serious illness.  Pain is provider about the best treatment for your back or neck pain. As a safety precaution, do not use a heating pad at bedtime. Sleeping with a heating pad can lead to skin burns or tissue damage.   · Therapeutic massage can help relax the back and neck muscles 14645. All rights reserved. This information is not intended as a substitute for professional medical care. Always follow your healthcare professional's instructions.         Back Care Tips    Caring for your back  These are things you can do to prevent a r conditions like diabetes, liver or kidney disease, stomach ulcers, or gastrointestinal bleeding, or are taking blood thinners, talk with your healthcare provider before taking any medicines.   · Be careful if you are given prescription pain medicines, narco pillow under your head to support your neck in a neutral spine position. Avoid thick pillows that bend your neck to one side. Put a pillow between your legs to further relax your lower back.  If you sleep on your back, put pillows under your knees to suppor What changed:  See the new instructions. Commonly known as:  MOTRIN           Insulin Glargine 100 UNIT/ML Sopn   Inject 13 Units into the skin nightly.    Commonly known as:  LANTUS SOLOSTAR           * NOVOLOG FLEXPEN 100 UNIT/ML Soln   Generic drug:  i [email or web address]

## (undated) NOTE — LETTER
Date & Time: 6/28/2019, 3:53 PM  Patient: Lowella Dakin  Encounter Provider(s):    Chantal Bey MD         This certifies that Michelle Yobany, a patient at an [facility], am leaving the facility voluntarily and against the

## (undated) NOTE — LETTER
[facility], [address] [facility], [address] [address] [address] ,[address], [place] [address] [facility] [address] [facility]     Stacia Guerra MD  • Lori Chung MD • Marion Barnes MD • DO Saulo Yen PA-C • CAREN Arce • Bevelyn Drilling Type 1 DM with mod nonproliferative diabetic retinopathy and without macular edema (HCC) [E10.339]    Type 1 DM with SEV nonproliferative diabetic retinopathy and without macular edema (HCC) [E10.349]    Type 1 DM with cataract (HCC) [E10.36]     Addition

## (undated) NOTE — Clinical Note
03/22/2017  Johanna Koo MD  • Eva Mckeon MD • Maritza Nieves MD • DO Flores Zavala PA-C • MEGAN Doan • David Vázquez PA-C     Eye Exam Results for Diabetic Patients     As soon as the patient is seen please complete th